# Patient Record
Sex: MALE | Race: WHITE | Employment: FULL TIME | URBAN - METROPOLITAN AREA
[De-identification: names, ages, dates, MRNs, and addresses within clinical notes are randomized per-mention and may not be internally consistent; named-entity substitution may affect disease eponyms.]

---

## 2019-01-14 ENCOUNTER — HOSPITAL ENCOUNTER (OUTPATIENT)
Dept: LAB | Age: 64
Discharge: HOME | End: 2019-01-14

## 2019-01-14 PROCEDURE — 88305 TISSUE EXAM BY PATHOLOGIST: CPT

## 2023-08-29 NOTE — PROGRESS NOTES
Name: Boyd Suh  YOB: 1955  Gender: male  MRN: [id number]    CC:   Chief Complaint   Patient presents with    Follow-up     Right shoulder    Right shoulder(s) pain, stiffness    HPI:  This patient presents with a 1 year history of Right shoulder pain and limited motion. Patient denies specific mechanism of the injury. The patient has had a right rotator cuff repair by Dr. Demar Roberts approximately 15 years ago. The patient states anterior shoulder pain with range of motion. He denies major changes in strength. The patient does note some popping. Also does state he has some numbness and tingling into the hand. Denies neck pain. Notes that this is worse during a motorcycle ride when he is using the accelerator a good bit on the right side. No Known Allergies  History reviewed. No pertinent past medical history. History reviewed. No pertinent surgical history. History reviewed. No pertinent family history. Social History     Socioeconomic History    Marital status:      Spouse name: Not on file    Number of children: Not on file    Years of education: Not on file    Highest education level: Not on file   Occupational History    Not on file   Tobacco Use    Smoking status: Never    Smokeless tobacco: Never   Substance and Sexual Activity    Alcohol use: Not on file    Drug use: Not on file    Sexual activity: Not on file   Other Topics Concern    Not on file   Social History Narrative    Not on file     Social Determinants of Health     Financial Resource Strain: Not on file   Food Insecurity: Not on file   Transportation Needs: Not on file   Physical Activity: Not on file   Stress: Not on file   Social Connections: Not on file   Intimate Partner Violence: Not on file   Housing Stability: Not on file        No flowsheet data found. Review of Systems  Non-contributory   Also noted on the patient medical history form on the chart and are reviewed today.   Pertinent positives

## 2023-09-01 ENCOUNTER — OFFICE VISIT (OUTPATIENT)
Dept: ORTHOPEDIC SURGERY | Age: 68
End: 2023-09-01

## 2023-09-01 VITALS — BODY MASS INDEX: 34.86 KG/M2 | HEIGHT: 68 IN | WEIGHT: 230 LBS

## 2023-09-01 DIAGNOSIS — M67.921 TENDINOPATHY OF RIGHT BICEPS TENDON: ICD-10-CM

## 2023-09-01 DIAGNOSIS — M25.511 RIGHT SHOULDER PAIN, UNSPECIFIED CHRONICITY: Primary | ICD-10-CM

## 2023-09-01 RX ORDER — MELOXICAM 7.5 MG/1
7.5 TABLET ORAL 2 TIMES DAILY PRN
Qty: 28 TABLET | Refills: 0 | Status: SHIPPED | OUTPATIENT
Start: 2023-09-01 | End: 2023-09-15

## 2023-09-01 RX ORDER — METHYLPREDNISOLONE ACETATE 40 MG/ML
80 INJECTION, SUSPENSION INTRA-ARTICULAR; INTRALESIONAL; INTRAMUSCULAR; SOFT TISSUE ONCE
Status: COMPLETED | OUTPATIENT
Start: 2023-09-01 | End: 2023-09-01

## 2023-09-01 RX ADMIN — METHYLPREDNISOLONE ACETATE 80 MG: 40 INJECTION, SUSPENSION INTRA-ARTICULAR; INTRALESIONAL; INTRAMUSCULAR; SOFT TISSUE at 11:19

## 2023-09-01 NOTE — PATIENT INSTRUCTIONS
motion and strength. You will work with your doctor and physical therapist to plan this exercise program. To get the best results, you need to do the exercises correctly and as often and as long as your doctor tells you. Ice    Put ice or a cold pack on your shoulder for 10 to 20 minutes at a time. Try to do this every 1 to 2 hours for the next 3 days (when you are awake) or until the swelling goes down. Put a thin cloth between the ice and your skin. Follow-up care is a key part of your treatment and safety. Be sure to make and go to all appointments, and call your doctor if you are having problems. It's also a good idea to know your test results and keep a list of the medicines you take. When should you call for help? Call 911  anytime you think you may need emergency care. For example, call if:    You passed out (lost consciousness). You have severe trouble breathing. You have chest pain, are short of breath, or cough up blood. Call your doctor now or seek immediate medical care if:    You have pain that does not get better after you take pain medicine. Your hand is cool or pale or changes color. Your cast or splint feels too tight. Your hand or fingers are tingly, weak, or numb. You are sick to your stomach or can't keep down fluids. You have loose stitches, or your incision comes open. You have signs of a blood clot in your leg (called a deep vein thrombosis), such as:  Pain in your calf, back of the knee, thigh, or groin. Redness or swelling in your leg. You have signs of infection, such as: Increased pain, swelling, warmth, or redness. Red streaks leading from the area. Pus draining from the area. A fever. Bright red blood has soaked through the bandage over your incision. Watch closely for changes in your health, and be sure to contact your doctor if:    Your cast or splint feels too tight, or your hand or fingers are swollen.      You do not get better

## 2023-10-17 ENCOUNTER — OFFICE VISIT (OUTPATIENT)
Dept: ORTHOPEDIC SURGERY | Age: 68
End: 2023-10-17
Payer: MEDICARE

## 2023-10-17 DIAGNOSIS — M25.511 RIGHT SHOULDER PAIN, UNSPECIFIED CHRONICITY: Primary | ICD-10-CM

## 2023-10-17 DIAGNOSIS — M67.921 TENDINOPATHY OF RIGHT BICEPS TENDON: ICD-10-CM

## 2023-10-17 PROCEDURE — G8417 CALC BMI ABV UP PARAM F/U: HCPCS | Performed by: ORTHOPAEDIC SURGERY

## 2023-10-17 PROCEDURE — 1036F TOBACCO NON-USER: CPT | Performed by: ORTHOPAEDIC SURGERY

## 2023-10-17 PROCEDURE — 1123F ACP DISCUSS/DSCN MKR DOCD: CPT | Performed by: ORTHOPAEDIC SURGERY

## 2023-10-17 PROCEDURE — 3017F COLORECTAL CA SCREEN DOC REV: CPT | Performed by: ORTHOPAEDIC SURGERY

## 2023-10-17 PROCEDURE — G8484 FLU IMMUNIZE NO ADMIN: HCPCS | Performed by: ORTHOPAEDIC SURGERY

## 2023-10-17 PROCEDURE — G8428 CUR MEDS NOT DOCUMENT: HCPCS | Performed by: ORTHOPAEDIC SURGERY

## 2023-10-17 PROCEDURE — 99213 OFFICE O/P EST LOW 20 MIN: CPT | Performed by: ORTHOPAEDIC SURGERY

## 2023-10-17 NOTE — PROGRESS NOTES
Social Connections: Not on file   Intimate Partner Violence: Not on file   Housing Stability: Not on file               No data to display                Review of Systems  Non-contributory    PE:       SHOULDER   Right (involved)  left   Skin Intact Intact   Radial Pulses 2+ symmetrical  2+ symmetrical   Myotomes Normal Normal   Dermatomes  Normal Normal   ROM Full Full   Strength No weakness, there is pain with mild external rotation No weakness   Atrophy None noted None noted   Effusion/Swelling  None None   Palpation Trace tender to palpate over the biceps tendon No Tenderness   Bicep Tendon Rupture  Negative, mild positive speeds/Yergason's Negative   Bear Hug, Belly Press Normal Not tested   Crossed Arm Adduction Test Not tested Not tested   Instability/Ant. Apprehension Test None  None   Impingement Negative Negative                     A/Plan:     ICD-10-CM    1. Right shoulder pain, unspecified chronicity  M25.511       2. Tendinopathy of right biceps tendon  M67.921            I discussed with the patient different treatment options in regards to the right shoulder. Overall, he is improved. Discussed we can repeat injection every 3 months if warranted. We discussed potential for biceps tendon rupture. Also discussed potential for biceps tenodesis versus tenotomy. At this time, he is doing well enough for he would like to wait and see how it continues to feel. Discussed also use of Voltaren gel. He may come back on as-needed basis if his symptoms continue and he wants to discuss surgical intervention would have to obtain an MRI. Otherwise, if he is wanting repeat injection due to exacerbation of symptoms he may return after December 1. No follow-ups on file.         [address]  10/17/23

## 2024-03-11 ENCOUNTER — TELEPHONE (OUTPATIENT)
Dept: ORTHOPEDIC SURGERY | Age: 69
End: 2024-03-11

## 2024-03-14 ENCOUNTER — OFFICE VISIT (OUTPATIENT)
Dept: ORTHOPEDIC SURGERY | Age: 69
End: 2024-03-14
Payer: MEDICARE

## 2024-03-14 DIAGNOSIS — M25.511 RIGHT SHOULDER PAIN, UNSPECIFIED CHRONICITY: Primary | ICD-10-CM

## 2024-03-14 DIAGNOSIS — M67.921 TENDINOPATHY OF RIGHT BICEPS TENDON: ICD-10-CM

## 2024-03-14 PROCEDURE — 20610 DRAIN/INJ JOINT/BURSA W/O US: CPT | Performed by: PHYSICIAN ASSISTANT

## 2024-03-14 RX ORDER — METHYLPREDNISOLONE ACETATE 40 MG/ML
80 INJECTION, SUSPENSION INTRA-ARTICULAR; INTRALESIONAL; INTRAMUSCULAR; SOFT TISSUE ONCE
Status: COMPLETED | OUTPATIENT
Start: 2024-03-14 | End: 2024-03-14

## 2024-03-14 RX ADMIN — METHYLPREDNISOLONE ACETATE 80 MG: 40 INJECTION, SUSPENSION INTRA-ARTICULAR; INTRALESIONAL; INTRAMUSCULAR; SOFT TISSUE at 11:40

## 2024-03-14 NOTE — PROGRESS NOTES
to pain, infection, steroid flare, increased blood sugar, fat necrosis, skin discoloration, and injury to blood vessels or nerves, the patient verbally consented to proceed with a glenohumeral joint injection.  They understand that we are using this is an alternative method of treatment and the decision to not proceed with elective major surgery.  We have discussed this decision in detail.  The affected right shoulder was sterilely prepped in standard fashion and injected with 2 cc of depo medrol (40mg/ml), 2 cc of 1% Lidocaine, and 2 cc of 0.5% Marcaine into the biceps tendon.  The patient tolerated the injection well.      Patient has trace posterior pain.  We discussed potential for GH CSI in the future if needed.   [place] about [place]     No follow-ups on file.        JEREMÍAS Garcia  03/14/24

## 2024-07-22 ENCOUNTER — OFFICE VISIT (OUTPATIENT)
Dept: ORTHOPEDIC SURGERY | Age: 69
End: 2024-07-22
Payer: MEDICARE

## 2024-07-22 DIAGNOSIS — M20.22 HALLUX RIGIDUS OF BOTH FEET: ICD-10-CM

## 2024-07-22 DIAGNOSIS — M20.21 HALLUX RIGIDUS OF BOTH FEET: ICD-10-CM

## 2024-07-22 DIAGNOSIS — M79.672 BILATERAL FOOT PAIN: Primary | ICD-10-CM

## 2024-07-22 DIAGNOSIS — M79.671 BILATERAL FOOT PAIN: Primary | ICD-10-CM

## 2024-07-22 PROCEDURE — 1036F TOBACCO NON-USER: CPT | Performed by: ORTHOPAEDIC SURGERY

## 2024-07-22 PROCEDURE — 3017F COLORECTAL CA SCREEN DOC REV: CPT | Performed by: ORTHOPAEDIC SURGERY

## 2024-07-22 PROCEDURE — 99214 OFFICE O/P EST MOD 30 MIN: CPT | Performed by: ORTHOPAEDIC SURGERY

## 2024-07-22 PROCEDURE — G8417 CALC BMI ABV UP PARAM F/U: HCPCS | Performed by: ORTHOPAEDIC SURGERY

## 2024-07-22 PROCEDURE — G8428 CUR MEDS NOT DOCUMENT: HCPCS | Performed by: ORTHOPAEDIC SURGERY

## 2024-07-22 PROCEDURE — 1123F ACP DISCUSS/DSCN MKR DOCD: CPT | Performed by: ORTHOPAEDIC SURGERY

## 2024-07-22 NOTE — PROGRESS NOTES
Name: Fredy Lezama  YOB: 1955  Gender: male  MRN: [id number]    Summary:   Bilateral hallux rigidus and left Planter fasciitis       CC: New Patient (Bilateral foot xrays obtained in office, Left heel pain and bilateral bunion pain )       HPI: Fredy Lezama is a 69 y.o. male who presents with New Patient (Bilateral foot xrays obtained in office, Left heel pain and bilateral bunion pain )  .  This patient presents the office with a longstanding history of bilateral forefoot pain and new onset left heel pain.    History was obtained by Patient and his wife    ROS/Meds/PSH/PMH/FH/SH: I personally reviewed the patients standard intake form.  Below are the pertinents    Tobacco:  reports that he has never smoked. He has never used smokeless tobacco.  Diabetes: None      Physical Examination:  Exam of the bilateral feet shows limited range of motion bilaterally.  He has pain at the extremes of motion but no pain in the mid range of motion.  He is palpable pulses and intact sensation.  Has mild tenderness to the plantar heel on the left without evidence of insufficiency.      Imaging:   Interpretation of imaging  Bilateral feet XR: AP, Lateral, Oblique views     ICD-10-CM    1. Bilateral foot pain  M79.671 XR Foot Standard Bilateral    M79.672       2. Hallux rigidus of both feet  M20.21     M20.22          Report: AP, lateral, oblique x-ray of the bilateral feet demonstrates hallux rigidus    Impression: Hallux rigidus   VAISHALI WEN III, MD           Assessment:   Bilateral hallux rigidus and left Lanter fasciitis    Treatment Plan:   4 This is a chronic illness/condition with exacerbation and progression  Treatment at this time: Elective major surgery with procedural risk factors  Studies ordered: NO XR needed @ Next Visit    Weight-bearing status: WBAT        Return to work/work restrictions: none  No medications given    Bilateral first MTP cheilectomy   Outpatient-30 minutes-sagittal saw

## 2024-08-05 ENCOUNTER — TELEPHONE (OUTPATIENT)
Dept: ORTHOPEDIC SURGERY | Age: 69
End: 2024-08-05

## 2024-08-06 DIAGNOSIS — M20.22 HALLUX RIGIDUS OF BOTH FEET: Primary | ICD-10-CM

## 2024-08-06 DIAGNOSIS — M20.21 HALLUX RIGIDUS OF BOTH FEET: Primary | ICD-10-CM

## 2024-08-06 NOTE — TELEPHONE ENCOUNTER
Spoke to patient and scheduled surgery for 08/20/2024 and we will send out the paperwork via mail and [employer]

## 2024-08-13 ENCOUNTER — TELEPHONE (OUTPATIENT)
Dept: ORTHOPEDIC SURGERY | Age: 69
End: 2024-08-13

## 2024-08-14 ENCOUNTER — TELEPHONE (OUTPATIENT)
Dept: ORTHOPEDIC SURGERY | Age: 69
End: 2024-08-14

## 2024-08-14 NOTE — TELEPHONE ENCOUNTER
He is calling again and just wants to make sure he hears from you and doesn't miss the call. I told him it might be tomorrow because you are on the alonso with patients and he was fine with that.

## 2024-08-14 NOTE — TELEPHONE ENCOUNTER
Left [facility] [place] for patient, if we do not speak with him today we can call him tomorrow.

## 2024-08-15 DIAGNOSIS — M20.22 HALLUX RIGIDUS OF BOTH FEET: Primary | ICD-10-CM

## 2024-08-15 DIAGNOSIS — M79.672 BILATERAL FOOT PAIN: ICD-10-CM

## 2024-08-15 DIAGNOSIS — M20.21 HALLUX RIGIDUS OF BOTH FEET: Primary | ICD-10-CM

## 2024-08-15 DIAGNOSIS — M79.671 BILATERAL FOOT PAIN: ICD-10-CM

## 2024-08-15 RX ORDER — SILDENAFIL 100 MG/1
TABLET, FILM COATED ORAL PRN
COMMUNITY
Start: 2024-06-07

## 2024-08-15 RX ORDER — ATORVASTATIN CALCIUM 10 MG/1
10 TABLET, FILM COATED ORAL DAILY
COMMUNITY

## 2024-08-15 RX ORDER — VALACYCLOVIR HYDROCHLORIDE 1 G/1
1000 TABLET, FILM COATED ORAL 2 TIMES DAILY PRN
COMMUNITY
Start: 2018-10-30

## 2024-08-15 NOTE — TELEPHONE ENCOUNTER
Spoke to patient about upcoming surgery and wanted to know of any equipment that is needed. I told him no is required but as we talked he would feel comfortable having a walker to help post op since he is having both feet worked on the same time. I have place order in chart and on the schedule for DME tomorrow 08/16/2024 at 9:30 am to  a walker at the [address] office.

## 2024-08-15 NOTE — PERIOP NOTE
Patient verified name and .  Order for consent NOT found in EHR at time of PAT visit. Unable to verify case posting against order; surgery verified by patient.    Type 1B surgery, phone assessment complete.  Orders not received.  Labs per surgeon: none ordered  Labs per anesthesia protocol: not indicated    Patient answered medical/surgical history questions at their best of ability. All prior to admission medications documented in EPIC.    Patient instructed to continue taking all prescription medications up to the day of surgery but to take only the following medications the day of surgery according to anesthesia guidelines with a small sip of water: atorvastatin.   Also, patient is requested to take 2 Tylenol in the morning and then again before bed on the day before surgery. Regular or extra strength may be used.       Patient informed that all vitamins and supplements should be held 7 days prior to surgery and NSAIDS 5 days prior to surgery. Prescription meds to hold:  viagra hold 24 hours prior to surgery    Patient instructed on the following:    > Arrive at [facility] Entrance, time of arrival to be called the day before by 1700  > NPO after midnight, unless otherwise indicated, including gum, mints, and ice chips  > Responsible adult must drive patient to the hospital, stay during surgery, and patient will need supervision 24 hours after anesthesia  > Use non moisturizing soap in shower the night before surgery and on the morning of surgery  > All piercings must be removed prior to arrival.    > Leave all valuables (money and jewelry) at home but bring insurance card and ID on DOS.   > You may be required to pay a deductible or co-pay on the day of your procedure. You can pre-pay by calling [phone] if your surgery is at the [facility] or [phone] if your surgery is at the [facility].  > Do not wear make-up, nail polish, lotions, cologne, perfumes, powders, or oil on skin. Artificial nails are not

## 2024-08-16 ENCOUNTER — OFFICE VISIT (OUTPATIENT)
Dept: ORTHOPEDIC SURGERY | Age: 69
End: 2024-08-16

## 2024-08-16 DIAGNOSIS — M20.21 HALLUX RIGIDUS OF BOTH FEET: ICD-10-CM

## 2024-08-16 DIAGNOSIS — M79.672 BILATERAL FOOT PAIN: Primary | ICD-10-CM

## 2024-08-16 DIAGNOSIS — M20.22 HALLUX RIGIDUS OF BOTH FEET: ICD-10-CM

## 2024-08-16 DIAGNOSIS — M79.671 BILATERAL FOOT PAIN: Primary | ICD-10-CM

## 2024-08-16 NOTE — PROGRESS NOTES
Patient was fitted and instructed on use of walker with wheels. I demonstrated the correct way to lock and unlock the walker. The walker was adjusted to the patient's height and arm length. Patient walked around with the walker to insure it was set at a comfortable height.    Patient read and signed documenting they understand and agree to [facility]'s current DME return policy.     The above DME items were also checked for same/similar on the Medicare portal. An ABN was issued if necessary.

## 2024-08-19 ENCOUNTER — ANESTHESIA EVENT (OUTPATIENT)
Dept: SURGERY | Age: 69
End: 2024-08-19
Payer: MEDICARE

## 2024-08-19 NOTE — PERIOP NOTE
Preop department called to notify patient of arrival time for scheduled procedure. Instructions given to   - Arrive at [facility]PC Entrance [address].  - Remain NPO after midnight, unless otherwise indicated, including gum, mints, and ice chips.   - Have a responsible adult to drive patient to the hospital, stay during surgery, and patient will need supervision 24 hours after anesthesia.   - Use antibacterial soap in shower the night before surgery and on the morning of surgery.       Was patient contacted: yes-pt  Voicemail left:   Numbers contacted: [phone]   Arrival time: 0730

## 2024-08-20 ENCOUNTER — ANESTHESIA (OUTPATIENT)
Dept: SURGERY | Age: 69
End: 2024-08-20
Payer: MEDICARE

## 2024-08-20 ENCOUNTER — HOSPITAL ENCOUNTER (OUTPATIENT)
Age: 69
Setting detail: OUTPATIENT SURGERY
Discharge: HOME | End: 2024-08-20
Attending: ORTHOPAEDIC SURGERY | Admitting: ORTHOPAEDIC SURGERY
Payer: MEDICARE

## 2024-08-20 VITALS
WEIGHT: 230 LBS | OXYGEN SATURATION: 99 % | SYSTOLIC BLOOD PRESSURE: 168 MMHG | HEIGHT: 68 IN | RESPIRATION RATE: 12 BRPM | TEMPERATURE: 97.5 F | DIASTOLIC BLOOD PRESSURE: 83 MMHG | HEART RATE: 53 BPM | BODY MASS INDEX: 34.86 KG/M2

## 2024-08-20 DIAGNOSIS — G89.18 ACUTE POSTOPERATIVE PAIN: Primary | ICD-10-CM

## 2024-08-20 PROCEDURE — 7100000001 HC PACU RECOVERY - ADDTL 15 MIN: Performed by: ORTHOPAEDIC SURGERY

## 2024-08-20 PROCEDURE — 2709999900 HC NON-CHARGEABLE SUPPLY: Performed by: ORTHOPAEDIC SURGERY

## 2024-08-20 PROCEDURE — 6360000002 HC RX W HCPCS: Performed by: ORTHOPAEDIC SURGERY

## 2024-08-20 PROCEDURE — 6370000000 HC RX 637 (ALT 250 FOR IP): Performed by: ANESTHESIOLOGY

## 2024-08-20 PROCEDURE — 7100000000 HC PACU RECOVERY - FIRST 15 MIN: Performed by: ORTHOPAEDIC SURGERY

## 2024-08-20 PROCEDURE — 3700000000 HC ANESTHESIA ATTENDED CARE: Performed by: ORTHOPAEDIC SURGERY

## 2024-08-20 PROCEDURE — 3600000014 HC SURGERY LEVEL 4 ADDTL 15MIN: Performed by: ORTHOPAEDIC SURGERY

## 2024-08-20 PROCEDURE — 7100000010 HC PHASE II RECOVERY - FIRST 15 MIN: Performed by: ORTHOPAEDIC SURGERY

## 2024-08-20 PROCEDURE — 2580000003 HC RX 258: Performed by: ANESTHESIOLOGY

## 2024-08-20 PROCEDURE — 6360000002 HC RX W HCPCS: Performed by: NURSE PRACTITIONER

## 2024-08-20 PROCEDURE — 6360000002 HC RX W HCPCS: Performed by: ANESTHESIOLOGY

## 2024-08-20 PROCEDURE — 3700000001 HC ADD 15 MINUTES (ANESTHESIA): Performed by: ORTHOPAEDIC SURGERY

## 2024-08-20 PROCEDURE — 6360000002 HC RX W HCPCS: Performed by: NURSE ANESTHETIST, CERTIFIED REGISTERED

## 2024-08-20 PROCEDURE — 2500000003 HC RX 250 WO HCPCS: Performed by: NURSE ANESTHETIST, CERTIFIED REGISTERED

## 2024-08-20 PROCEDURE — 3600000004 HC SURGERY LEVEL 4 BASE: Performed by: ORTHOPAEDIC SURGERY

## 2024-08-20 RX ORDER — SODIUM CHLORIDE, SODIUM LACTATE, POTASSIUM CHLORIDE, CALCIUM CHLORIDE 600; 310; 30; 20 MG/100ML; MG/100ML; MG/100ML; MG/100ML
INJECTION, SOLUTION INTRAVENOUS CONTINUOUS
Status: DISCONTINUED | OUTPATIENT
Start: 2024-08-20 | End: 2024-08-20 | Stop reason: HOSPADM

## 2024-08-20 RX ORDER — SODIUM CHLORIDE 9 MG/ML
INJECTION, SOLUTION INTRAVENOUS PRN
Status: DISCONTINUED | OUTPATIENT
Start: 2024-08-20 | End: 2024-08-20 | Stop reason: HOSPADM

## 2024-08-20 RX ORDER — DIPHENHYDRAMINE HYDROCHLORIDE 50 MG/ML
12.5 INJECTION INTRAMUSCULAR; INTRAVENOUS
Status: DISCONTINUED | OUTPATIENT
Start: 2024-08-20 | End: 2024-08-20 | Stop reason: HOSPADM

## 2024-08-20 RX ORDER — FENTANYL CITRATE 50 UG/ML
100 INJECTION, SOLUTION INTRAMUSCULAR; INTRAVENOUS
Status: DISCONTINUED | OUTPATIENT
Start: 2024-08-20 | End: 2024-08-20 | Stop reason: HOSPADM

## 2024-08-20 RX ORDER — CEPHALEXIN 500 MG/1
500 CAPSULE ORAL 4 TIMES DAILY
Qty: 12 CAPSULE | Refills: 0 | Status: SHIPPED | OUTPATIENT
Start: 2024-08-20

## 2024-08-20 RX ORDER — SODIUM CHLORIDE 0.9 % (FLUSH) 0.9 %
5-40 SYRINGE (ML) INJECTION EVERY 12 HOURS SCHEDULED
Status: DISCONTINUED | OUTPATIENT
Start: 2024-08-20 | End: 2024-08-20 | Stop reason: HOSPADM

## 2024-08-20 RX ORDER — MIDAZOLAM HYDROCHLORIDE 2 MG/2ML
2 INJECTION, SOLUTION INTRAMUSCULAR; INTRAVENOUS
Status: DISCONTINUED | OUTPATIENT
Start: 2024-08-20 | End: 2024-08-20 | Stop reason: HOSPADM

## 2024-08-20 RX ORDER — SODIUM CHLORIDE 0.9 % (FLUSH) 0.9 %
5-40 SYRINGE (ML) INJECTION PRN
Status: DISCONTINUED | OUTPATIENT
Start: 2024-08-20 | End: 2024-08-20 | Stop reason: HOSPADM

## 2024-08-20 RX ORDER — HYDROMORPHONE HYDROCHLORIDE 2 MG/ML
0.5 INJECTION, SOLUTION INTRAMUSCULAR; INTRAVENOUS; SUBCUTANEOUS EVERY 10 MIN PRN
Status: DISCONTINUED | OUTPATIENT
Start: 2024-08-20 | End: 2024-08-20 | Stop reason: HOSPADM

## 2024-08-20 RX ORDER — OXYCODONE HYDROCHLORIDE 5 MG/1
5 TABLET ORAL EVERY 6 HOURS PRN
Qty: 20 TABLET | Refills: 0 | Status: SHIPPED | OUTPATIENT
Start: 2024-08-20 | End: 2024-08-25

## 2024-08-20 RX ORDER — IBUPROFEN 600 MG/1
1 TABLET ORAL PRN
Status: DISCONTINUED | OUTPATIENT
Start: 2024-08-20 | End: 2024-08-20 | Stop reason: HOSPADM

## 2024-08-20 RX ORDER — LIDOCAINE HYDROCHLORIDE 10 MG/ML
1 INJECTION, SOLUTION INFILTRATION; PERINEURAL
Status: DISCONTINUED | OUTPATIENT
Start: 2024-08-20 | End: 2024-08-20 | Stop reason: HOSPADM

## 2024-08-20 RX ORDER — ASPIRIN 81 MG/1
81 TABLET ORAL 2 TIMES DAILY
Qty: 60 TABLET | Refills: 0 | Status: SHIPPED | OUTPATIENT
Start: 2024-08-20

## 2024-08-20 RX ORDER — LIDOCAINE HYDROCHLORIDE 20 MG/ML
INJECTION, SOLUTION EPIDURAL; INFILTRATION; INTRACAUDAL; PERINEURAL PRN
Status: DISCONTINUED | OUTPATIENT
Start: 2024-08-20 | End: 2024-08-20 | Stop reason: SDUPTHER

## 2024-08-20 RX ORDER — ACETAMINOPHEN 500 MG
1000 TABLET ORAL ONCE
Status: COMPLETED | OUTPATIENT
Start: 2024-08-20 | End: 2024-08-20

## 2024-08-20 RX ORDER — BUPIVACAINE HYDROCHLORIDE 5 MG/ML
INJECTION, SOLUTION EPIDURAL; INTRACAUDAL PRN
Status: DISCONTINUED | OUTPATIENT
Start: 2024-08-20 | End: 2024-08-20 | Stop reason: ALTCHOICE

## 2024-08-20 RX ORDER — PROPOFOL 10 MG/ML
INJECTION, EMULSION INTRAVENOUS PRN
Status: DISCONTINUED | OUTPATIENT
Start: 2024-08-20 | End: 2024-08-20 | Stop reason: SDUPTHER

## 2024-08-20 RX ORDER — DEXTROSE MONOHYDRATE 100 MG/ML
INJECTION, SOLUTION INTRAVENOUS CONTINUOUS PRN
Status: DISCONTINUED | OUTPATIENT
Start: 2024-08-20 | End: 2024-08-20 | Stop reason: HOSPADM

## 2024-08-20 RX ORDER — NALOXONE HYDROCHLORIDE 0.4 MG/ML
INJECTION, SOLUTION INTRAMUSCULAR; INTRAVENOUS; SUBCUTANEOUS PRN
Status: DISCONTINUED | OUTPATIENT
Start: 2024-08-20 | End: 2024-08-20 | Stop reason: HOSPADM

## 2024-08-20 RX ORDER — PROCHLORPERAZINE EDISYLATE 5 MG/ML
5 INJECTION INTRAMUSCULAR; INTRAVENOUS
Status: DISCONTINUED | OUTPATIENT
Start: 2024-08-20 | End: 2024-08-20 | Stop reason: HOSPADM

## 2024-08-20 RX ORDER — OXYCODONE HYDROCHLORIDE 5 MG/1
5 TABLET ORAL
Status: COMPLETED | OUTPATIENT
Start: 2024-08-20 | End: 2024-08-20

## 2024-08-20 RX ADMIN — OXYCODONE HYDROCHLORIDE 5 MG: 5 TABLET ORAL at 10:41

## 2024-08-20 RX ADMIN — ACETAMINOPHEN 1000 MG: 500 TABLET, FILM COATED ORAL at 08:12

## 2024-08-20 RX ADMIN — PROPOFOL 100 MG: 10 INJECTION, EMULSION INTRAVENOUS at 09:24

## 2024-08-20 RX ADMIN — LIDOCAINE HYDROCHLORIDE 100 MG: 20 INJECTION, SOLUTION EPIDURAL; INFILTRATION; INTRACAUDAL; PERINEURAL at 09:22

## 2024-08-20 RX ADMIN — PROPOFOL 100 MG: 10 INJECTION, EMULSION INTRAVENOUS at 09:23

## 2024-08-20 RX ADMIN — SODIUM CHLORIDE, POTASSIUM CHLORIDE, SODIUM LACTATE AND CALCIUM CHLORIDE: 600; 310; 30; 20 INJECTION, SOLUTION INTRAVENOUS at 08:12

## 2024-08-20 RX ADMIN — HYDROMORPHONE HYDROCHLORIDE 0.5 MG: 2 INJECTION INTRAMUSCULAR; INTRAVENOUS; SUBCUTANEOUS at 10:36

## 2024-08-20 RX ADMIN — Medication 2000 MG: at 09:30

## 2024-08-20 RX ADMIN — PROPOFOL 200 MG: 10 INJECTION, EMULSION INTRAVENOUS at 09:22

## 2024-08-20 ASSESSMENT — PAIN SCALES - GENERAL
PAINLEVEL_OUTOF10: 5
PAINLEVEL_OUTOF10: 6

## 2024-08-20 ASSESSMENT — PAIN - FUNCTIONAL ASSESSMENT: PAIN_FUNCTIONAL_ASSESSMENT: 0-10

## 2024-08-20 NOTE — ANESTHESIA PRE PROCEDURE
Department of Anesthesiology  Preprocedure Note       Name:  Fredy Lezama   Age:  69 y.o.  :  1955                                          MRN:  [id number]         Date:  2024      Surgeon: Surgeon(s):  William Patel III, MD    Procedure: Procedure(s):  Bilateral first metatarsophangeal cheilectomy    Medications prior to admission:   Prior to Admission medications    Medication Sig Start Date End Date Taking? Authorizing Provider   valACYclovir (VALTREX) 1 g tablet Take 1 tablet by mouth 2 times daily as needed 10/30/18  Yes Provider, MD Kassy   sildenafil (VIAGRA) 100 MG tablet as needed 24  Yes Provider, MD Kassy   Naproxen Sodium (ALEVE PO) Take by mouth as needed   Yes ProviderKassy MD   atorvastatin (LIPITOR) 10 MG tablet 1 tablet daily    Provider, MD Kassy       Current medications:    Current Facility-Administered Medications   Medication Dose Route Frequency Provider Last Rate Last Admin   • ceFAZolin (ANCEF) 2000 mg in sterile water 20 mL IV syringe  2,000 mg IntraVENous On Call to OR Stephani Chao APRN - CNP       • lactated ringers IV soln infusion   IntraVENous Continuous Stephani Chao APRN - CNP       • sodium chloride flush 0.9 % injection 5-40 mL  5-40 mL IntraVENous 2 times per day Stephani Chao APRN - CNP       • sodium chloride flush 0.9 % injection 5-40 mL  5-40 mL IntraVENous PRN Stephani Chao APRN - CNP       • 0.9 % sodium chloride infusion   IntraVENous PRN Stephani Chao APRN - CNP       • lidocaine 1 % injection 1 mL  1 mL IntraDERmal Once PRN Chele Crowell MD       • acetaminophen (TYLENOL) tablet 1,000 mg  1,000 mg Oral Once Chele Crowell MD       • fentaNYL (SUBLIMAZE) injection 100 mcg  100 mcg IntraVENous Once PRN Chele Crowell MD       • lactated ringers IV soln infusion   IntraVENous Continuous Chele Crowell MD       • sodium chloride flush 0.9 % injection 5-40 mL  5-40 mL IntraVENous 2

## 2024-08-20 NOTE — H&P
Outpatient Surgery History and Physical:  Fredy Lezama was seen and examined.    CHIEF COMPLAINT:   bilateral feet.     PE:   BP (!) 140/81   Pulse 51   Temp 98.1 °F (36.7 °C) (Oral)   Resp 18   Ht 1.727 m (5' 8\")   Wt 104.3 kg (230 lb)   SpO2 95%   BMI 34.97 kg/m²     Heart:   Regular rhythm      Lungs:  Are clear      Past Medical History:    Past Medical History:   Diagnosis Date    Hyperlipidemia        Surgical History:   Past Surgical History:   Procedure Laterality Date    ROTATOR CUFF REPAIR Bilateral        Social History: Patient  reports that he has never smoked. He has never used smokeless tobacco. He reports current alcohol use of about 10.0 standard drinks of alcohol per week. He reports that he does not use drugs.    Family History: History reviewed. No pertinent family history.    Allergies: Reviewed per EMR  Patient has no known allergies.    Medications:    Prior to Admission medications    Medication Sig Start Date End Date Taking? Authorizing Provider   atorvastatin (LIPITOR) 10 MG tablet 1 tablet daily   Yes Kassy Webster MD   Naproxen Sodium (ALEVE PO) Take by mouth as needed   Yes Kassy Webster MD   valACYclovir (VALTREX) 1 g tablet Take 1 tablet by mouth 2 times daily as needed 10/30/18   Kassy Webster MD   sildenafil (VIAGRA) 100 MG tablet as needed 6/7/24   ProviderKassy MD       The surgery is planned for the Bilateral first metatarsophangeal cheilectomy .        History and physical has been reviewed. The patient has been examined. There have been no significant clinical changes since the completion of the originally dated History and Physical.  Patient identified by surgeon; surgical site was confirmed by patient and surgeon.      The patient is here today for outpatient surgery. I have examined the patient, no changes are noted in the patient's medical status. Necessity for the procedure/care is still present and the history and physical above

## 2024-08-20 NOTE — DISCHARGE INSTRUCTIONS
POSTOPERATIVE SURGICAL INSTRUCTIONS/ INFORMATION:    Your narcotic (pain medication) and an antibiotic will be sent to the pharmacy listed in your chart.  Both of these medications should be taken as directed on the bottle.      Pain can be hard to manage postoperatively especially if you had an anesthetic nerve block and do not know when it will wear off.  It is recommended that you start taking pain medication even with an anesthetic block the night of surgery.  The anesthetic nerve block can last up to 72 hours postoperatively, your leg will likely be numb as long as the anesthetic block is working.      If you are taking the pain medication as directed on the bottle and your pain is not managed or controlled you may double the medication, taking 2 tablets every 4-6 hours as needed for 24 hours.  Once pain level is controlled you will resume the recommended dosage on the bottle.    Pain medication may cause constipation, to help minimize this ensure you are drinking plenty of water after surgery.  You may start a stool softener and/or MiraLAX to help alleviate or mitigate this problem.  Please take these over-the-counter products as directed on the bottle.    Please make every effort to leave your postoperative dressing that was placed sterilely in the operating room and placed until your first postoperative visit.    If bleeding through your bandage occurs you may reinforce the dressing with additional gauze and an Ace wrap.    PLEASE DO NOT GET THE SURGICAL DRESSING WET.  It is recommended using a snug compressive device such as a cast bag to prevent the dressing from getting wet when bathing if you need assistance in any way with this please call our office.    Nausea and vomiting can be common after surgical procedure.  Please ensure you take narcotics (pain medication) with food.  If the symptoms become severe please call our office.    Fevers may also be common after surgery, it is one of the body's many

## 2024-08-20 NOTE — OP NOTE
Operative Note    Patient:Fredy Lezama  MRN: [id number]    Date Of Surgery: 8/20/2024    Surgeon: William Patel MD    Assistant Surgeon: None    Pre Op Diagnosis:  Pre-Op Diagnosis Codes:      * Hallux rigidus of both feet [M20.21, M20.22]      Post Op Diagnosis:   same    Procedures Performed:  Bilateral first MTP cheilectomy's, 28289x2    Implants:   * No implants in log *    Anesthesia:  Choice    Blood Loss:  minimal    Tourniquet:  Estimated calf 15 minutes left and right    Pre Operative Abx:   Ancef 2g            Pre Operative Course:  Fredy Lezama is a 69 y.o. male who has a history of bilateral hallux rigidus.    Operation In Detail:  Patient was evaluated in the preoperative area.  We had a long discussion about the procedure and postoperative protocols.  The patient was then brought back to the operating room suite and placed in the operating room table.  A timeout was taken to identify the patient, procedure being performed, and laterality.  After this the patient was prepped and draped in the normal sterile fashion using a Betadine solution and/or a ChloraPrep solution.  A timeout was then taken to identify the patient his name, date of birth, laterality, and procedure being performed.  We also identified allergies and any concerns about the operation.  Attention was then placed to the operative extremity.  During a preop surgical timeout the correct operative sites were identified and prepped and draped in the standard sterile fashions and ChloraPrep solution.  Dorsal approach just above the bilateral first MTP joints were opened at that time followed by capsulotomies.  All loose bodies were removed at that time.  A sagittal saw was used to remove large osteophytes metatarsal head and a rongeur was used to decompress both gutters of synovitis and remove bone spurs from the base of the proximal phalanx.  Both wounds were then irrigated and closed using Vicryl and the capsule followed by

## 2024-08-20 NOTE — ANESTHESIA POSTPROCEDURE EVALUATION
Department of Anesthesiology  Postprocedure Note    Patient: Fredy Lezama  MRN: [id number]  YOB: 1955  Date of evaluation: 8/20/2024    Procedure Summary       Date: 08/20/24 Room / Location: [facility] OP OR 01 / [facility]    Anesthesia Start: 0919 Anesthesia Stop: 1015    Procedure: Bilateral first metatarsophangeal cheilectomy (Bilateral: Foot) Diagnosis:       Hallux rigidus of both feet      (Hallux rigidus of both feet [M20.21, M20.22])    Surgeons: William Patel III, MD Responsible Provider: Chele Crowell MD    Anesthesia Type: General ASA Status: 2            Anesthesia Type: General    Rachelle Phase I: Rachelle Score: 6    Rachelle Phase II: Rachelle Score: 10    Anesthesia Post Evaluation    Patient location during evaluation: PACU  Patient participation: complete - patient participated  Level of consciousness: awake and alert  Airway patency: patent  Nausea: well controlled.  Cardiovascular status: acceptable.  Respiratory status: acceptable  Hydration status: stable  Pain management: adequate    No notable events documented.

## 2024-09-04 ENCOUNTER — TELEPHONE (OUTPATIENT)
Dept: ORTHOPEDIC SURGERY | Age: 69
End: 2024-09-04

## 2024-09-04 ENCOUNTER — OFFICE VISIT (OUTPATIENT)
Dept: ORTHOPEDIC SURGERY | Age: 69
End: 2024-09-04

## 2024-09-04 DIAGNOSIS — M20.22 HALLUX RIGIDUS OF BOTH FEET: Primary | ICD-10-CM

## 2024-09-04 DIAGNOSIS — M20.21 HALLUX RIGIDUS OF BOTH FEET: Primary | ICD-10-CM

## 2024-09-04 PROCEDURE — 99024 POSTOP FOLLOW-UP VISIT: CPT | Performed by: ORTHOPAEDIC SURGERY

## 2024-09-04 NOTE — TELEPHONE ENCOUNTER
This patient was  seen today and  he  did not take  home  a home  exercise  sheet    He has  called  back and  ask  for you to please  email  it  to  him

## 2024-09-04 NOTE — PROGRESS NOTES
Name: Fredy Lezama  YOB: 1955  Gender: male  MRN: [id number]    Procedure Performed:Bilateral first metatarsophangeal cheilectomy - Bilateral        Date of Procedure: 8/20/2024      Subjective: Doing well      Physical Examination: Incisions are healing well without sign of infection.        Imaging:   No imaging reviewed           VAISHALI WEN III, MD           Assessment:   Bilateral cheilectomy      Plan:   3 This is stable chronic illness/condition  Treatment at this time: Physical Therapy  Studies ordered: NO XR needed @ Next Visit    Weight-bearing status: WBAT        Return to work/work restrictions: none  No medications given      He can start supervised PT and return in 4 weeks

## 2024-09-09 ENCOUNTER — HOSPITAL ENCOUNTER (OUTPATIENT)
Dept: PHYSICAL THERAPY | Age: 69
Setting detail: RECURRING SERIES
Discharge: HOME | End: 2024-09-12
Attending: ORTHOPAEDIC SURGERY
Payer: MEDICARE

## 2024-09-09 DIAGNOSIS — Z98.890 STATUS POST BILATERAL FOOT SURGERY: Primary | ICD-10-CM

## 2024-09-09 DIAGNOSIS — M20.22 HALLUX RIGIDUS OF BOTH FEET: ICD-10-CM

## 2024-09-09 DIAGNOSIS — M20.21 HALLUX RIGIDUS OF BOTH FEET: ICD-10-CM

## 2024-09-09 PROCEDURE — 97110 THERAPEUTIC EXERCISES: CPT

## 2024-09-09 PROCEDURE — 97161 PT EVAL LOW COMPLEX 20 MIN: CPT

## 2024-09-09 ASSESSMENT — PAIN DESCRIPTION - LOCATION: LOCATION: FOOT

## 2024-09-09 ASSESSMENT — PAIN SCALES - GENERAL: PAINLEVEL_OUTOF10: 0

## 2024-09-12 ENCOUNTER — HOSPITAL ENCOUNTER (OUTPATIENT)
Dept: PHYSICAL THERAPY | Age: 69
Setting detail: RECURRING SERIES
Discharge: HOME | End: 2024-09-15
Attending: ORTHOPAEDIC SURGERY
Payer: MEDICARE

## 2024-09-12 PROCEDURE — 97110 THERAPEUTIC EXERCISES: CPT

## 2024-09-12 PROCEDURE — 97140 MANUAL THERAPY 1/> REGIONS: CPT

## 2024-09-12 ASSESSMENT — PAIN SCALES - GENERAL: PAINLEVEL_OUTOF10: 2

## 2024-09-16 ENCOUNTER — HOSPITAL ENCOUNTER (OUTPATIENT)
Dept: PHYSICAL THERAPY | Age: 69
Setting detail: RECURRING SERIES
Discharge: HOME | End: 2024-09-19
Attending: ORTHOPAEDIC SURGERY
Payer: MEDICARE

## 2024-09-16 PROCEDURE — 97140 MANUAL THERAPY 1/> REGIONS: CPT

## 2024-09-16 PROCEDURE — 97110 THERAPEUTIC EXERCISES: CPT

## 2024-09-16 ASSESSMENT — PAIN SCALES - GENERAL: PAINLEVEL_OUTOF10: 1

## 2024-09-19 ENCOUNTER — HOSPITAL ENCOUNTER (OUTPATIENT)
Dept: PHYSICAL THERAPY | Age: 69
Setting detail: RECURRING SERIES
Discharge: HOME | End: 2024-09-22
Attending: ORTHOPAEDIC SURGERY
Payer: MEDICARE

## 2024-09-19 PROCEDURE — 97110 THERAPEUTIC EXERCISES: CPT

## 2024-09-19 PROCEDURE — 97140 MANUAL THERAPY 1/> REGIONS: CPT

## 2024-09-24 ENCOUNTER — HOSPITAL ENCOUNTER (OUTPATIENT)
Dept: PHYSICAL THERAPY | Age: 69
Setting detail: RECURRING SERIES
Discharge: HOME | End: 2024-09-27
Attending: ORTHOPAEDIC SURGERY
Payer: MEDICARE

## 2024-09-24 PROCEDURE — 97110 THERAPEUTIC EXERCISES: CPT

## 2024-09-24 PROCEDURE — 97140 MANUAL THERAPY 1/> REGIONS: CPT

## 2024-09-26 ENCOUNTER — HOSPITAL ENCOUNTER (OUTPATIENT)
Dept: PHYSICAL THERAPY | Age: 69
Setting detail: RECURRING SERIES
Discharge: HOME | End: 2024-09-29
Attending: ORTHOPAEDIC SURGERY
Payer: MEDICARE

## 2024-09-26 PROCEDURE — 97110 THERAPEUTIC EXERCISES: CPT

## 2024-09-26 ASSESSMENT — PAIN SCALES - GENERAL: PAINLEVEL_OUTOF10: 3

## 2024-09-26 ASSESSMENT — PAIN DESCRIPTION - LOCATION: LOCATION: FOOT

## 2024-09-26 NOTE — PROGRESS NOTES
Fredy Lezaam  : 1955  Primary: Medicare Part A And B (Medicare)  Secondary: [employer] [place] MEDICARE SUPP [facility] [place] [address]  Phone: [phone]  Fax: [phone] Plan Frequency: 2x per week    Plan of Care/Certification Expiration Date: 24        Plan of Care/Certification Expiration Date:  Plan of Care/Certification Expiration Date: 24    Frequency/Duration:  Plan Frequency: 2x per week       Time In/Out:   Time In: 0958  Time Out: 1026      PT Visit Info:    Plan Frequency: 2x per week  Total # of Visits to Date: 6  Progress Note Counter: 6      Visit Count: 6    OUTPATIENT PHYSICAL THERAPY:  Treatment Note 2024       Charge Capture   Episode (Hallux rigidus post-op)               Treatment Diagnosis:  Status post bilateral foot surgery  Hallux rigidus of both feet  Medical/Referring Diagnosis:    Hallux rigidus of both feet [M20.21, M20.22]    Referring Physician: William Patel III, MD MD Orders: PT Eval and Treat  Return MD Appt: TBD  Date of Onset: 24 (DOS)  Allergies: Patient has no known allergies.  Restrictions/Precautions:   Weight Bearing Status: WBAT      Interventions Planned: (Treatment may consist of any combination of the following):  Current Treatment Recommendations: Strengthening; ROM; Balance training; Neuromuscular re-education; Manual; Pain management; Home exercise program; Safety education & training; Modalities; Positioning; Dry needling; Therapeutic activities    Subjective Comments: Patient reports he's doing well this morning. He says he's been on his feet a lot going up and down ladders. He arrives late to his appointment this morning due to weather.    Initial Pain Level:   Foot 3/10  Post Session Pain Level:   Foot 3/10     Medications Last Reviewed: 2024    Updated Objective Findings: None Today    Treatment     THERAPEUTIC EXERCISE: (28 minutes): Exercises per grid below to

## 2024-09-26 NOTE — PROGRESS NOTES
Fredy Lezama  : 1955  Primary: Medicare Part A And B  Secondary: [employer] [place] MEDICARE SUPP [facility] @ [facility]  [address] [place] [address] [place] [address]  Phone: [phone]  Fax: [phone] Plan Frequency: 2x per week    Plan of Care/Certification Expiration Date: 24      PT Visit Info:  Total # of Visits to Date: 6  Progress Note Counter: 6         OUTPATIENT PHYSICAL THERAPY 2024     Appt Desk   Episode   MyChart      DISCONTINUATION SUMMARY: Mr. Lezama participated in 6 physical therapy visit(s). Treatment has been discontinued at this time. The goals established at the start of care were not met due to inability to reassess secondary to lack of participation. The current episode of care will be closed at this time.     Thank you for this referral.     Kash Valverde, PT, DPT

## 2024-09-30 ENCOUNTER — APPOINTMENT (OUTPATIENT)
Dept: PHYSICAL THERAPY | Age: 69
End: 2024-09-30
Attending: ORTHOPAEDIC SURGERY
Payer: MEDICARE

## 2024-10-02 ENCOUNTER — OFFICE VISIT (OUTPATIENT)
Dept: ORTHOPEDIC SURGERY | Age: 69
End: 2024-10-02

## 2024-10-02 DIAGNOSIS — M20.21 HALLUX RIGIDUS OF BOTH FEET: Primary | ICD-10-CM

## 2024-10-02 DIAGNOSIS — M20.22 HALLUX RIGIDUS OF BOTH FEET: Primary | ICD-10-CM

## 2024-10-02 PROCEDURE — 99024 POSTOP FOLLOW-UP VISIT: CPT | Performed by: NURSE PRACTITIONER

## 2024-10-02 NOTE — PROGRESS NOTES
Name: Fredy Lezama  YOB: 1955  Gender: male  MRN: [id number]    Procedure Performed:Bilateral first metatarsophangeal cheilectomy - Bilateral           Date of Procedure: 8/20/2024    Subjective: Patient reports he is doing okay since his last visit.  He has progressed to jogging and does continue with exercises at home to help with mobility of the great toes as well as for the left-sided plantar fasciitis.  He did go to therapy and did receive some benefit from this.      Physical Examination: Incisions to bilateral first MTP joints is well-healed at this point there are no signs of infection.  He does have palpable pulses and intact sensation to bilateral feet.  He is able to plantarflex the great toes bilaterally easier than dorsiflexion.  He is able to achieve about 40 degrees of dorsiflexion to bilateral toes.        Imaging:   No imaging reviewed          Assessment:   Status post bilateral first MTP cheilectomy's.      Plan:   3 This is stable chronic illness/condition  Treatment at this time: Time with no intervention, he may follow-up on a as needed basis.  There are no longer any restrictions on his levels of activity he may do as he can tolerate.  Studies ordered: NO XR needed @ Next Visit    Weight-bearing status: WBAT        Return to work/work restrictions: none  No medications given

## 2025-01-28 ENCOUNTER — OFFICE VISIT (OUTPATIENT)
Dept: ORTHOPEDIC SURGERY | Age: 70
End: 2025-01-28
Payer: MEDICARE

## 2025-01-28 DIAGNOSIS — M67.921 TENDINOPATHY OF RIGHT BICEPS TENDON: Primary | ICD-10-CM

## 2025-01-28 DIAGNOSIS — M25.511 RIGHT SHOULDER PAIN, UNSPECIFIED CHRONICITY: ICD-10-CM

## 2025-01-28 PROCEDURE — G8417 CALC BMI ABV UP PARAM F/U: HCPCS | Performed by: PHYSICIAN ASSISTANT

## 2025-01-28 PROCEDURE — 99214 OFFICE O/P EST MOD 30 MIN: CPT | Performed by: PHYSICIAN ASSISTANT

## 2025-01-28 PROCEDURE — 1036F TOBACCO NON-USER: CPT | Performed by: PHYSICIAN ASSISTANT

## 2025-01-28 PROCEDURE — 3017F COLORECTAL CA SCREEN DOC REV: CPT | Performed by: PHYSICIAN ASSISTANT

## 2025-01-28 PROCEDURE — 1123F ACP DISCUSS/DSCN MKR DOCD: CPT | Performed by: PHYSICIAN ASSISTANT

## 2025-01-28 PROCEDURE — G8427 DOCREV CUR MEDS BY ELIG CLIN: HCPCS | Performed by: PHYSICIAN ASSISTANT

## 2025-01-28 RX ORDER — METHYLPREDNISOLONE ACETATE 40 MG/ML
80 INJECTION, SUSPENSION INTRA-ARTICULAR; INTRALESIONAL; INTRAMUSCULAR; SOFT TISSUE ONCE
Status: DISCONTINUED | OUTPATIENT
Start: 2025-01-28 | End: 2025-01-29

## 2025-01-28 NOTE — PROGRESS NOTES
Name: Fredy Lezama  YOB: 1955  Gender: male  MRN: [id number]    CC:   Chief Complaint   Patient presents with    Injections     Right shoulder CSI        History of Present Illness  The patient is a 69-year-old male who presents for a follow-up of his right shoulder.    He reports that the injection administered to his shoulder biceps tendon on 3-14-24 provided significant relief. However, he currently experiences pain predominantly on the outer aspect of the shoulder, which radiates into the elbow joint. He does not experience any popping, clicking, or grinding sensations, nor does he report any numbness or tingling. He recalls an incident in November where he slipped on stairs while wearing stockings, landing on his elbow and jamming it. Prior to this incident, he had mild discomfort, but following the incident, he was unable to move his arm for several days. He speculates that he may have dislocated and relocated the joint. He does not report any loss of strength but notes increased pain when attempting to lift objects such as a cup of coffee. He also reports that the pain disrupts his sleep, causing him to wake up at night. He has been using Voltaren gel and Biofreeze alternately, which provide immediate relief. He has been managing his pain with Advil, taking two tablets up to three times a day, although he typically only requires one dose. He has previously tried meloxicam. He is currently on a six-day course of prednisone, taking two tablets daily, and doxycycline for bronchitis. He finds some relief from sleeping on his left arm and placing his hand on his shoulder, but this is currently not possible due to congestion. He underwent rotator cuff repair in 2001.    MEDICATIONS  Current: Advil, prednisone, doxycycline  Past: meloxicam    No Known Allergies  Past Medical History:   Diagnosis Date    Hyperlipidemia      Past Surgical History:   Procedure Laterality Date    FOOT SURGERY

## 2025-02-03 RX ORDER — ACETAMINOPHEN 325 MG/1
TABLET ORAL
COMMUNITY

## 2025-02-03 RX ORDER — DEXTROMETHORPHAN POLISTIREX 30 MG/5ML
SUSPENSION ORAL
COMMUNITY

## 2025-02-03 RX ORDER — DOXYCYCLINE 100 MG/1
CAPSULE ORAL
COMMUNITY
Start: 2025-01-25

## 2025-02-03 RX ORDER — BROMPHENIRAMINE MALEATE, PSEUDOEPHEDRINE HYDROCHLORIDE, AND DEXTROMETHORPHAN HYDROBROMIDE 2; 30; 10 MG/5ML; MG/5ML; MG/5ML
SYRUP ORAL EVERY 4 HOURS PRN
COMMUNITY
Start: 2024-04-29

## 2025-02-03 RX ORDER — ALBUTEROL SULFATE 90 UG/1
2 INHALANT RESPIRATORY (INHALATION) EVERY 6 HOURS PRN
COMMUNITY
Start: 2025-01-25

## 2025-02-03 RX ORDER — PREDNISONE 20 MG/1
TABLET ORAL DAILY
COMMUNITY
Start: 2025-01-25

## 2025-02-04 ENCOUNTER — OFFICE VISIT (OUTPATIENT)
Dept: ORTHOPEDIC SURGERY | Age: 70
End: 2025-02-04
Payer: MEDICARE

## 2025-02-04 DIAGNOSIS — M25.511 RIGHT SHOULDER PAIN, UNSPECIFIED CHRONICITY: ICD-10-CM

## 2025-02-04 DIAGNOSIS — M67.921 TENDINOPATHY OF RIGHT BICEPS TENDON: Primary | ICD-10-CM

## 2025-02-04 PROCEDURE — G8417 CALC BMI ABV UP PARAM F/U: HCPCS | Performed by: ORTHOPAEDIC SURGERY

## 2025-02-04 PROCEDURE — 3017F COLORECTAL CA SCREEN DOC REV: CPT | Performed by: ORTHOPAEDIC SURGERY

## 2025-02-04 PROCEDURE — 99214 OFFICE O/P EST MOD 30 MIN: CPT | Performed by: ORTHOPAEDIC SURGERY

## 2025-02-04 PROCEDURE — G8428 CUR MEDS NOT DOCUMENT: HCPCS | Performed by: ORTHOPAEDIC SURGERY

## 2025-02-04 PROCEDURE — 1036F TOBACCO NON-USER: CPT | Performed by: ORTHOPAEDIC SURGERY

## 2025-02-04 PROCEDURE — 1123F ACP DISCUSS/DSCN MKR DOCD: CPT | Performed by: ORTHOPAEDIC SURGERY

## 2025-02-04 NOTE — PROGRESS NOTES
Name: Fredy Lezama  YOB: 1955  Gender: male  MRN: [id number]    CC:   Chief Complaint   Patient presents with    Follow-up     R Shoulder MRI Results         History of Present Illness  The patient is a 69-year-old male who presents for right shoulder evaluation.    He reports no new developments in his shoulder condition since his last consultation with Ludy, during which he received an injection. He has been managing his pain with Voltaren and Aleve, which have provided some relief. The pain, described as achy, radiates downwards when it originates from the back of the shoulder. He also experiences pain in the front of the shoulder. His pain is not constant and varies between the anterior and posterior aspects of the shoulder, depending on the day. He recalls an incident in early 11/2024, prior to a trip to [place], where he slipped on the stairs due to his bunion surgery, resulting in a fall where he landed on his elbow and jammed his shoulder. This incident occurred the day before his flight, and he experienced difficulty wearing a backpack for about a week. He suspected a dislocation but did not seek immediate medical attention. After a week, his condition improved. He reported this incident to Ludy, who expressed concern about a potential tear. He received an injection almost a year ago, which was beneficial during his skiing trip and motorcycle ride to [place]. However, he experienced a recurrence of symptoms in 09/2024 and 10/2024 following a fall. He also reports difficulty using TRX straps at the gym this morning, particularly with pulling and rotating movements. He has noticed a difference in strength between his arms. He has been sleeping on his side, with his hand on his biceps, to alleviate the pain. He is considering postponing any surgical intervention for 6 months due to yard work commitments. He has been managing his pain with Voltaren and Aleve, which have provided some relief.

## 2025-02-11 DIAGNOSIS — M67.921 TENDINOPATHY OF RIGHT BICEPS TENDON: Primary | ICD-10-CM

## 2025-02-11 DIAGNOSIS — M25.511 RIGHT SHOULDER PAIN, UNSPECIFIED CHRONICITY: ICD-10-CM

## 2025-03-14 DIAGNOSIS — M67.921 TENDINOPATHY OF RIGHT BICEPS TENDON: Primary | ICD-10-CM

## 2025-03-14 DIAGNOSIS — M25.511 RIGHT SHOULDER PAIN, UNSPECIFIED CHRONICITY: ICD-10-CM

## 2025-04-22 DIAGNOSIS — Z09 SURGERY FOLLOW-UP: Primary | ICD-10-CM

## 2025-04-22 RX ORDER — ASPIRIN 325 MG
325 TABLET ORAL DAILY
Qty: 7 TABLET | Refills: 0 | Status: SHIPPED | OUTPATIENT
Start: 2025-05-12 | End: 2025-05-19

## 2025-04-22 RX ORDER — MELOXICAM 7.5 MG/1
7.5 TABLET ORAL 2 TIMES DAILY
Qty: 28 TABLET | Refills: 0 | Status: SHIPPED | OUTPATIENT
Start: 2025-05-12 | End: 2025-05-26

## 2025-04-22 RX ORDER — ONDANSETRON 8 MG/1
4 TABLET, ORALLY DISINTEGRATING ORAL EVERY 6 HOURS
Qty: 16 TABLET | Refills: 0 | Status: SHIPPED | OUTPATIENT
Start: 2025-05-12

## 2025-04-22 RX ORDER — OXYCODONE AND ACETAMINOPHEN 7.5; 325 MG/1; MG/1
1-2 TABLET ORAL
Qty: 36 TABLET | Refills: 0 | Status: SHIPPED | OUTPATIENT
Start: 2025-05-12 | End: 2025-05-15

## 2025-04-22 RX ORDER — AMOXICILLIN 250 MG
1 CAPSULE ORAL DAILY
Qty: 21 TABLET | Refills: 0 | Status: SHIPPED | OUTPATIENT
Start: 2025-05-12

## 2025-04-28 DIAGNOSIS — M67.921 TENDINOPATHY OF RIGHT BICEPS TENDON: Primary | ICD-10-CM

## 2025-04-28 DIAGNOSIS — M25.511 RIGHT SHOULDER PAIN, UNSPECIFIED CHRONICITY: ICD-10-CM

## 2025-05-01 RX ORDER — COVID-19 ANTIGEN TEST
2 KIT MISCELLANEOUS 2 TIMES DAILY PRN
COMMUNITY

## 2025-05-01 NOTE — PERIOP NOTE
Patient verified name and .  Order for consent  was found in EHR and does match case posting; patient verifies procedure.   Type 1B surgery, phone assessment complete.  Orders  received.  Labs per surgeon: none ordered  Labs per anesthesia protocol: not indicated    Patient answered medical/surgical history questions at their best of ability. All prior to admission medications documented in EPIC.    Patient instructed to continue taking all prescription medications up to the day of surgery but to take only the following medications the day of surgery according to anesthesia guidelines with a small sip of water: atorvastatin, valacyclovir if needed.   Also, patient is requested to take 2 Tylenol in the morning and then again before bed on the day before surgery. Regular or extra strength may be used.       Patient informed that all vitamins and supplements should be held 7 days prior to surgery and NSAIDS (aleve, voltaren gel) 5 days prior to surgery. Prescription meds to hold: viagra hold 24 hours prior to surgery    Patient instructed on the following:    > Arrive at [facility] Entr[facility], time of arrival to be called the day before by 1700  > No food after midnight, patient may drink clear liquids up until 2 hours prior to arrival. No gum, candy, mints.   > Responsible adult must drive patient to the hospital, stay during surgery, and patient will need supervision 24 hours after anesthesia  > Use non moisturizing soap in shower the night before surgery and on the morning of surgery  > All piercings must be removed prior to arrival.    > Leave all valuables (money and jewelry) at home but bring insurance card and ID on DOS.   > You may be required to pay a deductible or co-pay on the day of your procedure. You can pre-pay by calling [phone] if your surgery is at the [facility] or [phone] if your surgery is at the [facility].  > Do not wear make-up, nail polish, lotions, cologne, perfumes, powders, or oil on

## 2025-05-05 ENCOUNTER — OFFICE VISIT (OUTPATIENT)
Dept: ORTHOPEDIC SURGERY | Age: 70
End: 2025-05-05

## 2025-05-05 DIAGNOSIS — M25.511 RIGHT SHOULDER PAIN, UNSPECIFIED CHRONICITY: ICD-10-CM

## 2025-05-05 DIAGNOSIS — M67.921 TENDINOPATHY OF RIGHT BICEPS TENDON: Primary | ICD-10-CM

## 2025-05-05 NOTE — PROGRESS NOTES
Patient was fitted and instructed on the use of a size M Ultrasling for the patient's right shoulder.   I provided the following instructions along with proper fitting as noted below.   Fitting instructions included   How to adjusting the thumb support and avoiding irritation to hand. Patient was instructed this should not be used until the block given at surgery has worn off and patient has regained feeling in hand.   How to manage the quick release connection.   The shoulder straps are adjusted to insure correct fit including trimming any excess material.   The shoulder strap crosses over the opposite shoulder being sure to avoid excess pull on neck  Placement of pillow placed at the waist line of the affected shoulder with the Velcro facing away from body allowing for sling attachment.   Positioning the elbow in sling as far back as possible providing adequate support  Keeping the forearm close to the body preventing excessive ER   Keeping the hand higher than the elbow to allow for adequate support of arm in sling and avoiding excess swelling to hand.   How to detach the shoulder strap saar and open front panel to allow for proper hygiene.  Patient was informed waist belt should stay in place at all times unless told otherwise by the Doctor or Physical Therapist.   Patient was also made aware to bring an oversized shirt to surgery to provide coverage and comfort when leaving the hospital.   The patient was instructed to bring ultrasling, oversized shirt, and iceman pad (if purchased or prescribed) with them on the day of surgery.   Patient was fitted and instructed on the use of an Ultrasling for the patients right shoulder. I fitted patient with a size M ultrasling. Patient was instructed to position elbow in sling as far back as possible and that the arm should be internally rotated lying nicely against abdomen. I demonstrated how the shoulder strap crosses over the opposite shoulder and connects to the

## 2025-05-09 NOTE — PERIOP NOTE
Preop department called to notify patient of arrival time for scheduled procedure. Instructions given to   - Arrive at [facility] Entr[facility] [address].  - Nothing to eat after midnight unless otherwise indicated. No gum, mints, or ice chips. You may have clear liquids two hours prior to arrival to the hospital.   - Have a responsible adult to drive patient to the hospital, stay during surgery, and patient will need supervision 24 hours after anesthesia.   - Use antibacterial soap in shower the night before surgery and on the morning of surgery.       Was patient contacted: yes, pt  Voicemail left: n/a  Numbers contacted: [phone]   Arrival time: 0645  Time to stop clear liquids: 0445

## 2025-05-09 NOTE — DISCHARGE INSTRUCTIONS
Rotator Cuff Repair Postoperative Instructions    Returning Home  Your pain after surgery will vary depending on the method of anesthesia used and from patient to patient. In the first 24 hours, pain medication should be taken regularly with small amounts of food. During this time, nausea and light-headedness are common and should improve in 2-5 days. Drinking fluids may help. If nausea persists, medicine can be prescribed by calling your doctor at [phone].    Leaving the Outpatient Surgery Center:  As you leave the surgery center, you will be in a sling and swath. The sling will have a pillow with it holding your arm away from your body slightly. Plan on wearing the sling for 4-6 weeks after surgery. Make sure that you have a large shirt or a button up shirt to wear home.    For the first week:  Sleeping and resting will be more comfortable if you are propped up in bed or have access to a recliner.  Ice your shoulder to help manage the pain. 30 minutes of ice every hour as needed.  You may come out of the sling 3-5x/day to do elbow, wrist and hand range of motion, and other home exercises (listed further down in - Home Excersizes) so your other joints do not get stiff. Just do not activate or use your shoulder muscles!  Sleep with your sling on.    Sling Use  You will be in the sling for 4 to 6 weeks. You may take the sling off to do your home exercises or physical therapy, or shower, but you need to wear the sling at all other times, even SLEEPING. To put the sling on:    Make sure the pillow of the sling is snug against your side and that your hand and elbow are parallel to the floor.  One strap will go around your waist and connect to the pillow.      Care of Your Incisions  Incisions and stitches are often checked/removed 6 to 10 days after surgery.  Moderate bleeding may occur at the incision sites. This should decrease quickly over time.  Leave the dressings from surgery in place for 48 to 72

## 2025-05-12 ENCOUNTER — ANESTHESIA (OUTPATIENT)
Dept: SURGERY | Age: 70
End: 2025-05-12
Payer: MEDICARE

## 2025-05-12 ENCOUNTER — HOSPITAL ENCOUNTER (OUTPATIENT)
Age: 70
Setting detail: OUTPATIENT SURGERY
Discharge: HOME | End: 2025-05-12
Attending: ORTHOPAEDIC SURGERY | Admitting: ORTHOPAEDIC SURGERY
Payer: MEDICARE

## 2025-05-12 ENCOUNTER — ANESTHESIA EVENT (OUTPATIENT)
Dept: SURGERY | Age: 70
End: 2025-05-12
Payer: MEDICARE

## 2025-05-12 VITALS
WEIGHT: 232 LBS | OXYGEN SATURATION: 91 % | BODY MASS INDEX: 35.16 KG/M2 | HEIGHT: 68 IN | RESPIRATION RATE: 18 BRPM | DIASTOLIC BLOOD PRESSURE: 76 MMHG | HEART RATE: 59 BPM | SYSTOLIC BLOOD PRESSURE: 131 MMHG | TEMPERATURE: 98.3 F

## 2025-05-12 PROCEDURE — 2500000003 HC RX 250 WO HCPCS: Performed by: NURSE ANESTHETIST, CERTIFIED REGISTERED

## 2025-05-12 PROCEDURE — 6370000000 HC RX 637 (ALT 250 FOR IP): Performed by: ANESTHESIOLOGY

## 2025-05-12 PROCEDURE — 3700000001 HC ADD 15 MINUTES (ANESTHESIA): Performed by: ORTHOPAEDIC SURGERY

## 2025-05-12 PROCEDURE — 2709999900 HC NON-CHARGEABLE SUPPLY: Performed by: ORTHOPAEDIC SURGERY

## 2025-05-12 PROCEDURE — 29827 SHO ARTHRS SRG RT8TR CUF RPR: CPT | Performed by: ORTHOPAEDIC SURGERY

## 2025-05-12 PROCEDURE — 23430 REPAIR BICEPS TENDON: CPT | Performed by: ORTHOPAEDIC SURGERY

## 2025-05-12 PROCEDURE — 2580000003 HC RX 258: Performed by: ANESTHESIOLOGY

## 2025-05-12 PROCEDURE — 6360000002 HC RX W HCPCS: Performed by: PHYSICIAN ASSISTANT

## 2025-05-12 PROCEDURE — 3700000000 HC ANESTHESIA ATTENDED CARE: Performed by: ORTHOPAEDIC SURGERY

## 2025-05-12 PROCEDURE — 6360000002 HC RX W HCPCS: Performed by: ORTHOPAEDIC SURGERY

## 2025-05-12 PROCEDURE — 23430 REPAIR BICEPS TENDON: CPT | Performed by: PHYSICIAN ASSISTANT

## 2025-05-12 PROCEDURE — 29827 SHO ARTHRS SRG RT8TR CUF RPR: CPT | Performed by: PHYSICIAN ASSISTANT

## 2025-05-12 PROCEDURE — 3600000014 HC SURGERY LEVEL 4 ADDTL 15MIN: Performed by: ORTHOPAEDIC SURGERY

## 2025-05-12 PROCEDURE — 2780000004 HC MISC SCREW $251-500: Performed by: ORTHOPAEDIC SURGERY

## 2025-05-12 PROCEDURE — 6360000002 HC RX W HCPCS: Performed by: NURSE ANESTHETIST, CERTIFIED REGISTERED

## 2025-05-12 PROCEDURE — 7100000010 HC PHASE II RECOVERY - FIRST 15 MIN: Performed by: ORTHOPAEDIC SURGERY

## 2025-05-12 PROCEDURE — 6360000002 HC RX W HCPCS: Performed by: ANESTHESIOLOGY

## 2025-05-12 PROCEDURE — 64415 NJX AA&/STRD BRCH PLXS IMG: CPT | Performed by: ANESTHESIOLOGY

## 2025-05-12 PROCEDURE — 7100000011 HC PHASE II RECOVERY - ADDTL 15 MIN: Performed by: ORTHOPAEDIC SURGERY

## 2025-05-12 PROCEDURE — 3600000004 HC SURGERY LEVEL 4 BASE: Performed by: ORTHOPAEDIC SURGERY

## 2025-05-12 PROCEDURE — 2500000003 HC RX 250 WO HCPCS: Performed by: ANESTHESIOLOGY

## 2025-05-12 PROCEDURE — 7100000000 HC PACU RECOVERY - FIRST 15 MIN: Performed by: ORTHOPAEDIC SURGERY

## 2025-05-12 PROCEDURE — 2720000010 HC SURG SUPPLY STERILE: Performed by: ORTHOPAEDIC SURGERY

## 2025-05-12 PROCEDURE — C1713 ANCHOR/SCREW BN/BN,TIS/BN: HCPCS | Performed by: ORTHOPAEDIC SURGERY

## 2025-05-12 PROCEDURE — 7100000001 HC PACU RECOVERY - ADDTL 15 MIN: Performed by: ORTHOPAEDIC SURGERY

## 2025-05-12 DEVICE — HEALICOIL REGENESORB 4.75 MM                                    SUTURE ANCHOR WITH ONE ULTRATAPE                                    SUTURE COBRAID BLUE AND ONE                                    ULTRABRAID SUTURE NO.2
Type: IMPLANTABLE DEVICE | Site: SHOULDER | Status: FUNCTIONAL
Brand: HEALICOIL REGENESORB

## 2025-05-12 DEVICE — HEALICOIL REGENESORB 4.75 MM                                    SUTURE ANCHOR WITH ONE ULTRATAPE                                    SUTURE BLUE AND ONE NO.2 ULTRABRAID SUTURE
Type: IMPLANTABLE DEVICE | Site: SHOULDER | Status: FUNCTIONAL
Brand: HEALICOIL REGENESORB

## 2025-05-12 DEVICE — HEALICOIL KNOTLESS PK  NST
Type: IMPLANTABLE DEVICE | Site: SHOULDER | Status: FUNCTIONAL
Brand: HEALICOIL

## 2025-05-12 RX ORDER — NEOSTIGMINE METHYLSULFATE 1 MG/ML
INJECTION, SOLUTION INTRAVENOUS
Status: DISCONTINUED | OUTPATIENT
Start: 2025-05-12 | End: 2025-05-12 | Stop reason: SDUPTHER

## 2025-05-12 RX ORDER — ROCURONIUM BROMIDE 10 MG/ML
INJECTION, SOLUTION INTRAVENOUS
Status: DISCONTINUED | OUTPATIENT
Start: 2025-05-12 | End: 2025-05-12 | Stop reason: SDUPTHER

## 2025-05-12 RX ORDER — SODIUM CHLORIDE, SODIUM LACTATE, POTASSIUM CHLORIDE, CALCIUM CHLORIDE 600; 310; 30; 20 MG/100ML; MG/100ML; MG/100ML; MG/100ML
INJECTION, SOLUTION INTRAVENOUS CONTINUOUS
Status: DISCONTINUED | OUTPATIENT
Start: 2025-05-12 | End: 2025-05-12 | Stop reason: HOSPADM

## 2025-05-12 RX ORDER — DEXAMETHASONE SODIUM PHOSPHATE 4 MG/ML
INJECTION, SOLUTION INTRA-ARTICULAR; INTRALESIONAL; INTRAMUSCULAR; INTRAVENOUS; SOFT TISSUE
Status: DISCONTINUED | OUTPATIENT
Start: 2025-05-12 | End: 2025-05-12 | Stop reason: SDUPTHER

## 2025-05-12 RX ORDER — TRANEXAMIC ACID 100 MG/ML
INJECTION, SOLUTION INTRAVENOUS
Status: DISCONTINUED | OUTPATIENT
Start: 2025-05-12 | End: 2025-05-12 | Stop reason: SDUPTHER

## 2025-05-12 RX ORDER — SODIUM CHLORIDE 0.9 % (FLUSH) 0.9 %
5-40 SYRINGE (ML) INJECTION PRN
Status: DISCONTINUED | OUTPATIENT
Start: 2025-05-12 | End: 2025-05-12 | Stop reason: HOSPADM

## 2025-05-12 RX ORDER — EPHEDRINE SULFATE 5 MG/ML
INJECTION INTRAVENOUS
Status: DISCONTINUED | OUTPATIENT
Start: 2025-05-12 | End: 2025-05-12 | Stop reason: SDUPTHER

## 2025-05-12 RX ORDER — LIDOCAINE HYDROCHLORIDE 10 MG/ML
1 INJECTION, SOLUTION INFILTRATION; PERINEURAL
Status: DISCONTINUED | OUTPATIENT
Start: 2025-05-12 | End: 2025-05-12 | Stop reason: HOSPADM

## 2025-05-12 RX ORDER — SODIUM CHLORIDE 0.9 % (FLUSH) 0.9 %
5-40 SYRINGE (ML) INJECTION EVERY 12 HOURS SCHEDULED
Status: DISCONTINUED | OUTPATIENT
Start: 2025-05-12 | End: 2025-05-12 | Stop reason: HOSPADM

## 2025-05-12 RX ORDER — GLYCOPYRROLATE 0.2 MG/ML
INJECTION INTRAMUSCULAR; INTRAVENOUS
Status: DISCONTINUED | OUTPATIENT
Start: 2025-05-12 | End: 2025-05-12 | Stop reason: SDUPTHER

## 2025-05-12 RX ORDER — SODIUM CHLORIDE 9 MG/ML
INJECTION, SOLUTION INTRAVENOUS PRN
Status: DISCONTINUED | OUTPATIENT
Start: 2025-05-12 | End: 2025-05-12 | Stop reason: HOSPADM

## 2025-05-12 RX ORDER — OXYCODONE HYDROCHLORIDE 5 MG/1
5 TABLET ORAL
Status: COMPLETED | OUTPATIENT
Start: 2025-05-12 | End: 2025-05-12

## 2025-05-12 RX ORDER — ONDANSETRON 2 MG/ML
INJECTION INTRAMUSCULAR; INTRAVENOUS
Status: DISCONTINUED | OUTPATIENT
Start: 2025-05-12 | End: 2025-05-12 | Stop reason: SDUPTHER

## 2025-05-12 RX ORDER — NALOXONE HYDROCHLORIDE 0.4 MG/ML
INJECTION, SOLUTION INTRAMUSCULAR; INTRAVENOUS; SUBCUTANEOUS PRN
Status: DISCONTINUED | OUTPATIENT
Start: 2025-05-12 | End: 2025-05-12 | Stop reason: HOSPADM

## 2025-05-12 RX ORDER — MIDAZOLAM HYDROCHLORIDE 2 MG/2ML
2 INJECTION, SOLUTION INTRAMUSCULAR; INTRAVENOUS
Status: COMPLETED | OUTPATIENT
Start: 2025-05-12 | End: 2025-05-12

## 2025-05-12 RX ORDER — PROPOFOL 10 MG/ML
INJECTION, EMULSION INTRAVENOUS
Status: DISCONTINUED | OUTPATIENT
Start: 2025-05-12 | End: 2025-05-12 | Stop reason: SDUPTHER

## 2025-05-12 RX ORDER — ONDANSETRON 2 MG/ML
4 INJECTION INTRAMUSCULAR; INTRAVENOUS
Status: COMPLETED | OUTPATIENT
Start: 2025-05-12 | End: 2025-05-12

## 2025-05-12 RX ORDER — LIDOCAINE HYDROCHLORIDE 20 MG/ML
INJECTION, SOLUTION EPIDURAL; INFILTRATION; INTRACAUDAL; PERINEURAL
Status: DISCONTINUED | OUTPATIENT
Start: 2025-05-12 | End: 2025-05-12 | Stop reason: SDUPTHER

## 2025-05-12 RX ORDER — EPINEPHRINE 1 MG/ML(1)
AMPUL (ML) INJECTION PRN
Status: DISCONTINUED | OUTPATIENT
Start: 2025-05-12 | End: 2025-05-12 | Stop reason: ALTCHOICE

## 2025-05-12 RX ORDER — HALOPERIDOL 5 MG/ML
1 INJECTION INTRAMUSCULAR
Status: DISCONTINUED | OUTPATIENT
Start: 2025-05-12 | End: 2025-05-12 | Stop reason: HOSPADM

## 2025-05-12 RX ORDER — BUPIVACAINE HYDROCHLORIDE AND EPINEPHRINE 5; 5 MG/ML; UG/ML
INJECTION, SOLUTION EPIDURAL; INTRACAUDAL; PERINEURAL
Status: DISCONTINUED | OUTPATIENT
Start: 2025-05-12 | End: 2025-05-12 | Stop reason: SDUPTHER

## 2025-05-12 RX ORDER — IPRATROPIUM BROMIDE AND ALBUTEROL SULFATE 2.5; .5 MG/3ML; MG/3ML
1 SOLUTION RESPIRATORY (INHALATION)
Status: DISCONTINUED | OUTPATIENT
Start: 2025-05-12 | End: 2025-05-12 | Stop reason: HOSPADM

## 2025-05-12 RX ORDER — BUPIVACAINE HYDROCHLORIDE 5 MG/ML
INJECTION, SOLUTION EPIDURAL; INTRACAUDAL; PERINEURAL
Status: DISCONTINUED | OUTPATIENT
Start: 2025-05-12 | End: 2025-05-12 | Stop reason: SDUPTHER

## 2025-05-12 RX ORDER — PHENYLEPHRINE HYDROCHLORIDE 10 MG/ML
INJECTION INTRAVENOUS
Status: DISCONTINUED | OUTPATIENT
Start: 2025-05-12 | End: 2025-05-12 | Stop reason: SDUPTHER

## 2025-05-12 RX ORDER — FENTANYL CITRATE 50 UG/ML
100 INJECTION, SOLUTION INTRAMUSCULAR; INTRAVENOUS
Status: COMPLETED | OUTPATIENT
Start: 2025-05-12 | End: 2025-05-12

## 2025-05-12 RX ORDER — FENTANYL CITRATE 50 UG/ML
50 INJECTION, SOLUTION INTRAMUSCULAR; INTRAVENOUS EVERY 5 MIN PRN
Status: DISCONTINUED | OUTPATIENT
Start: 2025-05-12 | End: 2025-05-12 | Stop reason: HOSPADM

## 2025-05-12 RX ORDER — ACETAMINOPHEN 500 MG
1000 TABLET ORAL ONCE
Status: COMPLETED | OUTPATIENT
Start: 2025-05-12 | End: 2025-05-12

## 2025-05-12 RX ADMIN — ROCURONIUM BROMIDE 30 MG: 10 INJECTION, SOLUTION INTRAVENOUS at 10:40

## 2025-05-12 RX ADMIN — SODIUM CHLORIDE, SODIUM LACTATE, POTASSIUM CHLORIDE, AND CALCIUM CHLORIDE: 600; 310; 30; 20 INJECTION, SOLUTION INTRAVENOUS at 09:00

## 2025-05-12 RX ADMIN — EPHEDRINE SULFATE 5 MG: 5 INJECTION INTRAVENOUS at 11:47

## 2025-05-12 RX ADMIN — FENTANYL CITRATE 100 MCG: 50 INJECTION INTRAMUSCULAR; INTRAVENOUS at 09:12

## 2025-05-12 RX ADMIN — PHENYLEPHRINE HYDROCHLORIDE 100 MCG: 10 INJECTION INTRAVENOUS at 11:35

## 2025-05-12 RX ADMIN — GLYCOPYRROLATE 0.4 MG: 0.2 INJECTION INTRAMUSCULAR; INTRAVENOUS at 11:55

## 2025-05-12 RX ADMIN — ACETAMINOPHEN 1000 MG: 500 TABLET, FILM COATED ORAL at 09:00

## 2025-05-12 RX ADMIN — EPHEDRINE SULFATE 5 MG: 5 INJECTION INTRAVENOUS at 11:31

## 2025-05-12 RX ADMIN — Medication 2000 MG: at 10:50

## 2025-05-12 RX ADMIN — LIDOCAINE HYDROCHLORIDE 100 MG: 20 INJECTION, SOLUTION EPIDURAL; INFILTRATION; INTRACAUDAL; PERINEURAL at 10:40

## 2025-05-12 RX ADMIN — PROPOFOL 200 MG: 10 INJECTION, EMULSION INTRAVENOUS at 10:40

## 2025-05-12 RX ADMIN — ONDANSETRON 4 MG: 2 INJECTION, SOLUTION INTRAMUSCULAR; INTRAVENOUS at 12:44

## 2025-05-12 RX ADMIN — PHENYLEPHRINE HYDROCHLORIDE 50 MCG: 10 INJECTION INTRAVENOUS at 10:49

## 2025-05-12 RX ADMIN — PHENYLEPHRINE HYDROCHLORIDE 50 MCG: 10 INJECTION INTRAVENOUS at 11:21

## 2025-05-12 RX ADMIN — EPHEDRINE SULFATE 2.5 MG: 5 INJECTION INTRAVENOUS at 11:12

## 2025-05-12 RX ADMIN — OXYCODONE HYDROCHLORIDE 5 MG: 5 TABLET ORAL at 12:44

## 2025-05-12 RX ADMIN — ONDANSETRON 4 MG: 2 INJECTION INTRAMUSCULAR; INTRAVENOUS at 11:52

## 2025-05-12 RX ADMIN — PHENYLEPHRINE HYDROCHLORIDE 100 MCG: 10 INJECTION INTRAVENOUS at 10:41

## 2025-05-12 RX ADMIN — PHENYLEPHRINE HYDROCHLORIDE 50 MCG: 10 INJECTION INTRAVENOUS at 11:50

## 2025-05-12 RX ADMIN — DEXAMETHASONE SODIUM PHOSPHATE 4 MG: 4 INJECTION, SOLUTION INTRA-ARTICULAR; INTRALESIONAL; INTRAMUSCULAR; INTRAVENOUS; SOFT TISSUE at 09:12

## 2025-05-12 RX ADMIN — TRANEXAMIC ACID 1000 MG: 100 INJECTION, SOLUTION INTRAVENOUS at 10:54

## 2025-05-12 RX ADMIN — DEXAMETHASONE SODIUM PHOSPHATE 4 MG: 4 INJECTION INTRA-ARTICULAR; INTRALESIONAL; INTRAMUSCULAR; INTRAVENOUS; SOFT TISSUE at 10:59

## 2025-05-12 RX ADMIN — SODIUM CHLORIDE, SODIUM LACTATE, POTASSIUM CHLORIDE, AND CALCIUM CHLORIDE: 600; 310; 30; 20 INJECTION, SOLUTION INTRAVENOUS at 11:26

## 2025-05-12 RX ADMIN — BUPIVACAINE HYDROCHLORIDE 7.5 ML: 5 INJECTION, SOLUTION EPIDURAL; INTRACAUDAL; PERINEURAL at 09:12

## 2025-05-12 RX ADMIN — BUPIVACAINE HYDROCHLORIDE AND EPINEPHRINE BITARTRATE 7.5 ML: 5; .005 INJECTION, SOLUTION EPIDURAL; INTRACAUDAL; PERINEURAL at 09:12

## 2025-05-12 RX ADMIN — EPHEDRINE SULFATE 2.5 MG: 5 INJECTION INTRAVENOUS at 11:24

## 2025-05-12 RX ADMIN — MIDAZOLAM HYDROCHLORIDE 2 MG: 1 INJECTION, SOLUTION INTRAMUSCULAR; INTRAVENOUS at 09:12

## 2025-05-12 RX ADMIN — PHENYLEPHRINE HYDROCHLORIDE 50 MCG: 10 INJECTION INTRAVENOUS at 11:08

## 2025-05-12 RX ADMIN — PHENYLEPHRINE HYDROCHLORIDE 50 MCG: 10 INJECTION INTRAVENOUS at 11:07

## 2025-05-12 RX ADMIN — Medication 4 MG: at 11:55

## 2025-05-12 ASSESSMENT — PAIN SCALES - GENERAL
PAINLEVEL_OUTOF10: 0
PAINLEVEL_OUTOF10: 4
PAINLEVEL_OUTOF10: 2

## 2025-05-12 ASSESSMENT — PAIN - FUNCTIONAL ASSESSMENT: PAIN_FUNCTIONAL_ASSESSMENT: 0-10

## 2025-05-12 NOTE — ANESTHESIA PRE PROCEDURE
Department of Anesthesiology  Preprocedure Note       Name:  Fredy Lezama   Age:  69 y.o.  :  1955                                          MRN:  [id number]         Date:  2025      Surgeon: Surgeon(s):  FÁTIMA Caba MD    Procedure: Procedure(s):  RIGHT SHOULDER ARTHROSCOPY ROTATOR CUFF REPAIR    BEACH CHAIR    REGIONAL BLOCK  RIGHT SHOULDER OPEN BICEPS TENODESIS   BEACH CHAIR     REGIONAL BLOCK    Medications prior to admission:   Prior to Admission medications    Medication Sig Start Date End Date Taking? Authorizing Provider   Naproxen Sodium (ALEVE) 220 MG CAPS Take 2 tablets by mouth 2 times daily as needed for Pain   Yes Provider, MD Kassy   diclofenac sodium (VOLTAREN) 1 % GEL Apply topically 4 times daily as needed for Pain   Yes ProviderKassy MD   Multiple Vitamins-Minerals (EMERGEN-C IMMUNE PO) Take by mouth Daily   Yes ProviderKassy MD   aspirin 325 MG tablet Take 1 tablet by mouth daily for 7 days Start after surgery  Patient taking differently: Take 1 tablet by mouth daily Start after surgery- post op 25  Lobito Melissa PA   meloxicam (MOBIC) 7.5 MG tablet Take 1 tablet by mouth in the morning and at bedtime for 14 days Take 1 tablet po BID for 2 weeks for pain and inflamation  Patient taking differently: Take 1 tablet by mouth in the morning and at bedtime Take 1 tablet po BID for 2 weeks for pain and inflamation    POST OP 25  Lobito Melissa PA   naloxone (NARCAN) 4 MG/0.1ML LIQD nasal spray 1 spray by Nasal route as needed for Opioid Reversal  Patient taking differently: 1 spray by Nasal route as needed for Opioid Reversal (post op) 25   Lobito Melissa PA   oxyCODONE-acetaminophen (PERCOCET) 7.5-325 MG per tablet Take 1-2 tablets by mouth every 4-6 hours as needed for Pain for up to 3 days. Start medication night of surgery. Max Daily Amount: 12 tablets  Patient taking differently: Take 1-2 tablets by mouth every 4-6 hours as

## 2025-05-12 NOTE — ANESTHESIA POSTPROCEDURE EVALUATION
Department of Anesthesiology  Postprocedure Note    Patient: Fredy Lezama  MRN: [id number]  YOB: 1955  Date of evaluation: 5/12/2025    Procedure Summary       Date: 05/12/25 Room / Location: [facility] OP OR 07 / [facility]    Anesthesia Start: 1034 Anesthesia Stop: 1216    Procedures:       RIGHT SHOULDER ARTHROSCOPY ROTATOR CUFF REPAIR (Right: Shoulder)      RIGHT SHOULDER OPEN BICEPS TENODESIS (Right: Shoulder) Diagnosis:       Tendinopathy of right biceps tendon      Right shoulder pain, unspecified chronicity      (Tendinopathy of right biceps tendon [M67.921])      (Right shoulder pain, unspecified chronicity [M25.511])    Surgeons: FÁTIMA Caba MD Responsible Provider: Richard Owens MD    Anesthesia Type: general ASA Status: 2            Anesthesia Type: No value filed.    Rachelle Phase I: Rachelle Score: 8    Rachelle Phase II:      Anesthesia Post Evaluation    Patient location during evaluation: PACU  Patient participation: complete - patient participated  Level of consciousness: awake and alert  Airway patency: patent  Nausea & Vomiting: no nausea and no vomiting  Cardiovascular status: hemodynamically stable  Respiratory status: acceptable, nonlabored ventilation and spontaneous ventilation  Hydration status: euvolemic  Comments: BP (!) 157/74   Pulse 74   Temp 97.2 °F (36.2 °C) (Temporal)   Resp 16   Ht 1.727 m (5' 8\")   Wt 105.2 kg (232 lb)   SpO2 98%   BMI 35.28 kg/m²     Multimodal analgesia pain management approach  Pain management: adequate and satisfactory to patient    No notable events documented.

## 2025-05-12 NOTE — ADDENDUM NOTE
Addendum  created 05/12/25 1237 by Richard Owens MD    Child order released for a procedure order, Clinical Note Signed, Intraprocedure Blocks edited, SmartForm saved

## 2025-05-12 NOTE — ANESTHESIA PROCEDURE NOTES
Peripheral Block    Patient location during procedure: pre-op  Reason for block: post-op pain management and at surgeon's request  Start time: 5/12/2025 9:12 AM  End time: 5/12/2025 9:16 AM  Staffing  Performed: anesthesiologist   Anesthesiologist: Richard Owens MD  Performed by: Richard Owens MD  Authorized by: Richard Owens MD    Preanesthetic Checklist  Completed: patient identified, IV checked, site marked, risks and benefits discussed, surgical/procedural consents, equipment checked, pre-op evaluation, timeout performed, anesthesia consent given, oxygen available, monitors applied/VS acknowledged and blood product R/B/A discussed and consented  Peripheral Block   Patient position: sitting  Prep: ChloraPrep  Provider prep: sterile gloves  Patient monitoring: cardiac monitor, continuous pulse ox, frequent blood pressure checks, IV access, oxygen and responsive to questions  Block type: Brachial plexus  Interscalene  Laterality: right  Injection technique: single-shot  Guidance: ultrasound guided  Local infiltration: lidocaine  Infiltration strength: 1 %  Local infiltration: lidocaine  Dose: 3 mL    Needle   Needle type: insulated echogenic nerve stimulator needle   Needle gauge: 20 G  Needle localization: ultrasound guidance  Needle length: 5 cm  Assessment   Injection assessment: negative aspiration for heme, no paresthesia on injection, local visualized surrounding nerve on ultrasound and no intravascular symptoms  Paresthesia pain: none  Slow fractionated injection: yes  Hemodynamics: stable  Outcomes: uncomplicated    Additional Notes  Risks and benefits of block discussed prior to sedation including the most likely scenario of complete block resolution within 24h, expected ipselateral diaphragm weakness, small risk of mireya's syndrome, low chance of intravascular injection, low chance of damage to surrounding structures, unlikely ,but possible  risk of unexpected tingling, numbness or weakness

## 2025-05-12 NOTE — OP NOTE
Name: Fredy Lezama  YOB: 1955  Gender: male  MRN: [id number]    Operative Note    5/12/2025     Preoperative diagnosis: Pre-Op Diagnosis Codes:      * Tendinopathy of right biceps tendon [M67.921]     * Right shoulder pain, unspecified chronicity [M25.511]    Postoperative diagnosis: Right complete rotator cuff tear supraspinatus and infraspinatus, biceps tendon partial tear and subluxation    Surgeons and Role:     * FÁTIMA Caba MD - Primary     Assistant: JEREMÍAS Garcia, assisted during the procedure.  She was necessary for patient positioning, wound closure, and assistance with the major portions of the operation.  Her presence decreased the operative time and potential complication rate.          Anesthesia: Choice, regional block    Antibiotics: Ancef 2 grams IV    Procedures:  Procedure(s):  RIGHT SHOULDER ARTHROSCOPY ROTATOR CUFF REPAIR  RIGHT SHOULDER OPEN BICEPS TENODESIS   ( LARGE)  11458  Subpectoral biceps tenodesis [id number]      Findings:  1. EUA -full passive range of motion in comparison to contralateral side   2. Intra-articular -minimal central humeral head or glenoid cartilage arthritic change.  Subscap appeared intact.  Biceps was partially torn on the intra-articular aspect and was hypertrophied.  It was also anteriorly shifted or subluxed.  The supraspinatus and infraspinatus were completely torn and retracted.  Tissue quality was good.  3. Subacromial -minimal CA ligament fraying.  Full-thickness tear as above of supraspinatus and infraspinatus.  4. AC joint -asymptomatic    Indications / Consent:  This is a patient who has persistent pain with some weakness in the shoulder.  They have not responded to conservative measures and were desirous of evaluation and possible arthroscopic or open repair of the rotator cuff.  After previous discussions and treatments using both conservative and/or non-operative treatment options the patient elected to proceed with surgery due to

## 2025-05-16 NOTE — PROGRESS NOTES
Name: Fredy Lzeama  YOB: 1955  Gender: male  MRN: [id number]    CC:   Chief Complaint   Patient presents with    Follow-up     1st p/o R Shoulder    1-2 weeks status post   Right Shoulder Arthroscopy Rotator Cuff Repair - Right and Right Shoulder Open Biceps Tenodesis - Right  5/12/2025    HPI: Patient is status post Right Shoulder Arthroscopy Rotator Cuff Repair - Right and Right Shoulder Open Biceps Tenodesis - Right. Patient is doing well.  Patient came in with a sling on the affected side.     Review of Systems  Noncontributory      PE:  Wounds are Clean, Dry and Intact. There is no sign of infection. The patient is neurovascularly intact. Swelling is within normal limits.    A/P:     ICD-10-CM    1. Tendinopathy of right biceps tendon  M67.921         Sutures were removed and were covered with Steri-strips.   Discussed HEP.  They will continue with use of the sling at this time.       Return in about 3 weeks (around 6/13/2025).      Lux Caba MD  05/23/25

## 2025-05-20 RX ORDER — DOCUSATE SODIUM 50MG AND SENNOSIDES 8.6MG 8.6; 5 MG/1; MG/1
TABLET, FILM COATED ORAL
Qty: 21 TABLET | Refills: 0 | OUTPATIENT
Start: 2025-05-20

## 2025-05-23 ENCOUNTER — OFFICE VISIT (OUTPATIENT)
Dept: ORTHOPEDIC SURGERY | Age: 70
End: 2025-05-23

## 2025-05-23 DIAGNOSIS — M67.921 TENDINOPATHY OF RIGHT BICEPS TENDON: Primary | ICD-10-CM

## 2025-05-23 PROCEDURE — 99024 POSTOP FOLLOW-UP VISIT: CPT | Performed by: ORTHOPAEDIC SURGERY

## 2025-05-30 NOTE — PROGRESS NOTES
Name: Fredy Lezama  YOB: 1955  Gender: male  MRN: [id number]    CC:   Chief Complaint   Patient presents with    Follow-up     S/P right shoulder scope RCR(large), open biceps tenodesis DOS 5/12/25    about 1 month status post  out  Right Shoulder Arthroscopy Rotator Cuff Repair - Right and Right Shoulder Open Biceps Tenodesis - Right  5/12/2025    HPI: Patient is about one month status post large rotator cuff repair, biceps tenodesis on 5-.  Patient is doing well.  The patient came in wearing a sling on the affected limb.  He DC'd the pillow approximately 1 week ago.  [facility] and has been to 2 sessions so far.    No Known Allergies  Past Medical History:   Diagnosis Date    Hyperlipidemia      Past Surgical History:   Procedure Laterality Date    BICEPS TENDON REPAIR Right 5/12/2025    RIGHT SHOULDER OPEN BICEPS TENODESIS performed by FÁTIMA Caba MD at [facility]    FOOT SURGERY Bilateral 8/20/2024    Bilateral first metatarsophangeal cheilectomy performed by William Patel III, MD at [facility]    ROTATOR CUFF REPAIR Bilateral     SHOULDER ARTHROSCOPY Right 5/12/2025    RIGHT SHOULDER ARTHROSCOPY ROTATOR CUFF REPAIR performed by FÁTIMA Caba MD at [facility]     History reviewed. No pertinent family history.  Social History     Socioeconomic History    Marital status:      Spouse name: Not on file    Number of children: Not on file    Years of education: Not on file    Highest education level: Not on file   Occupational History    Not on file   Tobacco Use    Smoking status: Never    Smokeless tobacco: Never   Vaping Use    Vaping status: Never Used   Substance and Sexual Activity    Alcohol use: Yes     Alcohol/week: 10.0 standard drinks of alcohol     Types: 10 Drinks containing 0.5 oz of alcohol per week    Drug use: Never    Sexual activity: Not on file   Other Topics Concern    Not on file   Social History Narrative    Not on file     Social Drivers of Health

## 2025-06-03 ENCOUNTER — HOSPITAL ENCOUNTER (OUTPATIENT)
Dept: PHYSICAL THERAPY | Age: 70
Setting detail: RECURRING SERIES
Discharge: HOME | End: 2025-06-06
Attending: ORTHOPAEDIC SURGERY
Payer: MEDICARE

## 2025-06-03 DIAGNOSIS — M67.921 TENDINOPATHY OF RIGHT BICEPS TENDON: ICD-10-CM

## 2025-06-03 DIAGNOSIS — M25.511 ACUTE PAIN OF RIGHT SHOULDER: ICD-10-CM

## 2025-06-03 DIAGNOSIS — M75.121 COMPLETE TEAR OF RIGHT ROTATOR CUFF, UNSPECIFIED WHETHER TRAUMATIC: Primary | ICD-10-CM

## 2025-06-03 DIAGNOSIS — M25.611 STIFFNESS OF RIGHT SHOULDER, NOT ELSEWHERE CLASSIFIED: ICD-10-CM

## 2025-06-03 PROCEDURE — 97162 PT EVAL MOD COMPLEX 30 MIN: CPT

## 2025-06-03 PROCEDURE — 97110 THERAPEUTIC EXERCISES: CPT

## 2025-06-03 NOTE — THERAPY EVALUATION
Fredy Lezama  : 1955  Primary: Medicare Part A And B (Medicare)  Secondary: [employer] MEDICARE SUPP [facility] [address]  [place] [address]  Phone: [phone]  Fax: [phone] Plan Frequency: 1-2x/week for 90 days    Plan of Care/Certification Expiration Date: 25        Plan of Care/Certification Expiration Date:  Plan of Care/Certification Expiration Date: 25    Frequency/Duration: Plan Frequency: 1-2x/week for 90 days      Time In/Out:   Time In: 0950  Time Out: 1030      PT Visit Info:    Total # of Visits to Date: 1      Visit Count:  1                OUTPATIENT PHYSICAL THERAPY:             Initial Assessment 6/3/2025               Episode (s/p R shoulder RCR, BT)         Treatment Diagnosis:    Complete tear of right rotator cuff, unspecified whether traumatic  Tendinopathy of right biceps tendon  Acute pain of right shoulder  Stiffness of right shoulder, not elsewhere classified  Medical/Referring Diagnosis:    Tendinopathy of right biceps tendon  Right shoulder pain, unspecified chronicity      Referring Physician:  FÁTIMA Caba MD MD Orders:  PT Eval and Treat per protocol   Return MD Appt:  6/10/25  Date of Onset:  Onset Date: 25     Allergies:  Patient has no known allergies.  Restrictions/Precautions:    Post Surgical Precautions: per large rotator cuff repair protocol       Medications Last Reviewed: 6/3/2025     SUBJECTIVE   History of Injury/Illness (Reason for Referral):  He had bunion surgery in August. November he slipped down the stairs and hit his R elbow. He did have a bicep tear prior to fall and was getting injections for that. Had an MRI and then surgery. Dr. Henderson he had a previous R shoulder scope. Surgery was May 12th. No pain/minimal pain in the shoulder. He was taking his pain medication and icing the shoulder. He is no longer taking the pain medication. Occasionally throbbing pain in the shoulder.

## 2025-06-04 NOTE — PROGRESS NOTES
Fredy Lezama  : 1955  Primary: Medicare Part A And B (Medicare)  Secondary: [employer] MEDICARE SUPP [facility] [address]  [place] [address]  Phone: [phone]  Fax: [phone] Plan Frequency: 1-2x/week for 90 days    Plan of Care/Certification Expiration Date: 25        Plan of Care/Certification Expiration Date:  Plan of Care/Certification Expiration Date: 25    Frequency/Duration: Plan Frequency: 1-2x/week for 90 days      Time In/Out:   Time In: 0950  Time Out: 1030      PT Visit Info:    Total # of Visits to Date: 1      Visit Count:  1    OUTPATIENT PHYSICAL THERAPY:   Treatment Note 6/3/2025       Episode  (s/p R shoulder RCR, BT)               Treatment Diagnosis:    Complete tear of right rotator cuff, unspecified whether traumatic  Tendinopathy of right biceps tendon  Acute pain of right shoulder  Stiffness of right shoulder, not elsewhere classified  Medical/Referring Diagnosis:    Tendinopathy of right biceps tendon  Right shoulder pain, unspecified chronicity      Referring Physician:  FÁTIMA Caba MD MD Orders:  PT Eval and Treat per protocol  Return MD Appt:  6/10/25   Date of Onset:  Onset Date: 25     Allergies:   Patient has no known allergies.  Restrictions/Precautions:   Post Surgical Precautions:   per large rotator cuff repair protocol       Interventions Planned (Treatment may consist of any combination of the following):  Current Treatment Recommendations: Strengthening; ROM; Manual; Modalities    See Assessment Note    Subjective Comments:   See Eval  Initial Pain Level:   0   /10  Post Session Pain Level:   0    /10  Medications Last Reviewed: 6/3/2025  Updated Objective Findings:  See Evaluation Note from today  Treatment   THERAPEUTIC EXERCISE: (10 minutes):    Exercises to improve R shoulder mobility and ROM. Pt supine and PROM to the R shoulder for ER at neutral, IR at neutral, abduction, and forward elevation.

## 2025-06-06 ENCOUNTER — HOSPITAL ENCOUNTER (OUTPATIENT)
Dept: PHYSICAL THERAPY | Age: 70
Setting detail: RECURRING SERIES
Discharge: HOME | End: 2025-06-09
Attending: ORTHOPAEDIC SURGERY
Payer: MEDICARE

## 2025-06-06 PROCEDURE — 97110 THERAPEUTIC EXERCISES: CPT

## 2025-06-06 ASSESSMENT — PAIN SCALES - GENERAL: PAINLEVEL_OUTOF10: 0

## 2025-06-06 NOTE — PROGRESS NOTES
Fredy Lezama  : 1955  Primary: Medicare Part A And B (Medicare)  Secondary: [employer] MEDIC[employer] SUPP [facility] [address] [facility]  [place] [address]  Phone: [phone]  Fax: [phone] Plan Frequency: 1-2x/week for 90 days    Plan of Care/Certification Expiration Date: 25        Plan of Care/Certification Expiration Date:  Plan of Care/Certification Expiration Date: 25    Frequency/Duration: Plan Frequency: 1-2x/week for 90 days      Time In/Out:   Time In: 1018  Time Out: 1045      PT Visit Info:    Total # of Visits to Date: 2  Progress Note Counter: 2      Visit Count:  2    OUTPATIENT PHYSICAL THERAPY:   Treatment Note 2025       Episode  (s/p R shoulder RCR, BT)               Treatment Diagnosis:    Complete tear of right rotator cuff, unspecified whether traumatic  Tendinopathy of right biceps tendon  Acute pain of right shoulder  Stiffness of right shoulder, not elsewhere classified  Medical/Referring Diagnosis:    Tendinopathy of right biceps tendon  Right shoulder pain, unspecified chronicity  Referring Physician:  FÁTIMA Caba MD MD Orders:  PT Eval and Treat per protocol  Return MD Appt:  6/10/25   Date of Onset:  Onset Date: 25     Allergies: Patient has no known allergies.  Restrictions/Precautions:   Post Surgical Precautions:   per large rotator cuff repair protocol       Interventions Planned (Treatment may consist of any combination of the following):  Current Treatment Recommendations: Strengthening; ROM; Manual; Modalities    See Assessment Note for interventions    Subjective Comments: Patient reports he's feeling a little sore this morning which is unusual for him. He said he's been moving his arm more than normal. He says he's not taking his oxycodone anymore.     Initial Pain Level:     0/10  Post Session Pain Level:     0/10    Medications Last Reviewed: 2025  Updated Objective Findings:  None Today    Treatment

## 2025-06-10 ENCOUNTER — OFFICE VISIT (OUTPATIENT)
Dept: ORTHOPEDIC SURGERY | Age: 70
End: 2025-06-10

## 2025-06-10 ENCOUNTER — HOSPITAL ENCOUNTER (OUTPATIENT)
Dept: PHYSICAL THERAPY | Age: 70
Setting detail: RECURRING SERIES
Discharge: HOME | End: 2025-06-13
Attending: ORTHOPAEDIC SURGERY
Payer: MEDICARE

## 2025-06-10 DIAGNOSIS — M67.921 TENDINOPATHY OF RIGHT BICEPS TENDON: Primary | ICD-10-CM

## 2025-06-10 DIAGNOSIS — Z09 SURGERY FOLLOW-UP: ICD-10-CM

## 2025-06-10 PROCEDURE — 99024 POSTOP FOLLOW-UP VISIT: CPT | Performed by: ORTHOPAEDIC SURGERY

## 2025-06-10 PROCEDURE — 97110 THERAPEUTIC EXERCISES: CPT

## 2025-06-10 NOTE — PROGRESS NOTES
Fredy Lezama  : 1955  Primary: Medicare Part A And B (Medicare)  Secondary: [employer] MEDIC[employer] SUPP [address]  [place] [address]  Phone: [phone]  Fax: [phone] Plan Frequency: 1-2x/week for 90 days    Plan of Care/Certification Expiration Date: 25        Plan of Care/Certification Expiration Date:  Plan of Care/Certification Expiration Date: 25    Frequency/Duration: Plan Frequency: 1-2x/week for 90 days      Time In/Out:   Time In: 1035  Time Out: 1105      PT Visit Info:    Total # of Visits to Date: 3  Progress Note Counter: 3      Visit Count:  3    OUTPATIENT PHYSICAL THERAPY:   Treatment Note 6/10/2025       Episode  (s/p R shoulder RCR, BT)               Treatment Diagnosis:    Complete tear of right rotator cuff, unspecified whether traumatic  Tendinopathy of right biceps tendon  Acute pain of right shoulder  Stiffness of right shoulder, not elsewhere classified  Medical/Referring Diagnosis:    Tendinopathy of right biceps tendon  Right shoulder pain, unspecified chronicity  Referring Physician:  FÁTIMA Caba MD MD Orders:  PT Eval and Treat per protocol  Return MD Appt:  25   Date of Onset:  Onset Date: 25     Allergies: Patient has no known allergies.  Restrictions/Precautions:   Post Surgical Precautions:   per large rotator cuff repair protocol       Interventions Planned (Treatment may consist of any combination of the following):  Current Treatment Recommendations: Strengthening; ROM; Manual; Modalities    See Assessment Note for interventions    Subjective Comments: Patient reports he saw MD this morning. He can come out of the sling at home.     Initial Pain Level:     0 /10  Post Session Pain Level:   0   /10    Medications Last Reviewed: 6/10/2025  Updated Objective Findings:  Shoulder:  PROM Shoulder (Degrees)  R Shoulder Flex  (0-180): 90  R Shoulder ABduction (0-180): 85  R Shoulder Int Rotation

## 2025-06-12 ENCOUNTER — HOSPITAL ENCOUNTER (OUTPATIENT)
Dept: PHYSICAL THERAPY | Age: 70
Setting detail: RECURRING SERIES
Discharge: HOME | End: 2025-06-15
Attending: ORTHOPAEDIC SURGERY
Payer: MEDICARE

## 2025-06-12 PROCEDURE — 97110 THERAPEUTIC EXERCISES: CPT

## 2025-06-12 NOTE — PROGRESS NOTES
Fredy Lezama  : 1955  Primary: Medicare Part A And B (Medicare)  Secondary: [employer] [place] [place] [place] [address]   [place] [address]  Phone: [phone]  Fax: [phone] Plan Frequency: 1-2x/week for 90 days    Plan of Care/Certification Expiration Date: 25        Plan of Care/Certification Expiration Date:  Plan of Care/Certification Expiration Date: 25    Frequency/Duration: Plan Frequency: 1-2x/week for 90 days      Time In/Out:   Time In: 1300  Time Out: 1335      PT Visit Info:    Total # of Visits to Date: 4  Progress Note Counter: 4      Visit Count:  4    OUTPATIENT PHYSICAL THERAPY:   Treatment Note 2025       Episode  (s/p R shoulder RCR, BT)               Treatment Diagnosis:    Complete tear of right rotator cuff, unspecified whether traumatic  Tendinopathy of right biceps tendon  Acute pain of right shoulder  Stiffness of right shoulder, not elsewhere classified  Medical/Referring Diagnosis:    Tendinopathy of right biceps tendon  Right shoulder pain, unspecified chronicity  Referring Physician:  FÁTIMA Caba MD MD Orders:  PT Eval and Treat per protocol  Return MD Appt:  25   Date of Onset:  Onset Date: 25     Allergies: Patient has no known allergies.  Restrictions/Precautions:   Post Surgical Precautions:   per large rotator cuff repair protocol       Interventions Planned (Treatment may consist of any combination of the following):  Current Treatment Recommendations: Strengthening; ROM; Manual; Modalities    See Assessment Note for interventions    Subjective Comments: Patient reports he has very minimal pain.  He reports a little soreness today, he did not wear the sling at all yesterday, and did some yardwork (L UE).      Initial Pain Level:     0 /10  Post Session Pain Level:   0   /10    Medications Last Reviewed: 2025  Updated Objective Findings:  Shoulder:     None today    Treatment   THERAPEUTIC

## 2025-06-18 ENCOUNTER — HOSPITAL ENCOUNTER (OUTPATIENT)
Dept: PHYSICAL THERAPY | Age: 70
Setting detail: RECURRING SERIES
Discharge: HOME | End: 2025-06-21
Attending: ORTHOPAEDIC SURGERY
Payer: MEDICARE

## 2025-06-18 PROCEDURE — 97110 THERAPEUTIC EXERCISES: CPT

## 2025-06-18 NOTE — PROGRESS NOTES
Fredy Lezama  : 1955  Primary: Medicare Part A And B (Medicare)  Secondary: [employer] MEDIC[employer] SUPP [address]  [place] [address]  Phone: [phone]  Fax: [phone] Plan Frequency: 1-2x/week for 90 days    Plan of Care/Certification Expiration Date: 25        Plan of Care/Certification Expiration Date:  Plan of Care/Certification Expiration Date: 25    Frequency/Duration: Plan Frequency: 1-2x/week for 90 days      Time In/Out:   Time In: 0850  Time Out: 09      PT Visit Info:    Total # of Visits to Date: 5  Progress Note Counter: 5      Visit Count:  5    OUTPATIENT PHYSICAL THERAPY:   Treatment Note 2025       Episode  (s/p R shoulder RCR, BT)               Treatment Diagnosis:    Complete tear of right rotator cuff, unspecified whether traumatic  Tendinopathy of right biceps tendon  Acute pain of right shoulder  Stiffness of right shoulder, not elsewhere classified  Medical/Referring Diagnosis:    Tendinopathy of right biceps tendon  Right shoulder pain, unspecified chronicity  Referring Physician:  FÁTIMA Caba MD MD Orders:  PT Eval and Treat per protocol  Return MD Appt:  25   Date of Onset:  Onset Date: 25     Allergies: Patient has no known allergies.  Restrictions/Precautions:   Post Surgical Precautions:  per large rotator cuff repair protocol       Interventions Planned (Treatment may consist of any combination of the following):  Current Treatment Recommendations: Strengthening; ROM; Manual; Modalities    See Assessment Note for interventions    Subjective Comments: Patient reports his elbow is bothering him a little bit but he has been using the lower part of his arm at home.    Initial Pain Level:     0 /10  Post Session Pain Level:   0   /10    Medications Last Reviewed: 2025  Updated Objective Findings:  Shoulder:  PROM Shoulder (Degrees)  R Shoulder Flex  (0-180): 110  R Shoulder ABduction

## 2025-06-20 ENCOUNTER — HOSPITAL ENCOUNTER (OUTPATIENT)
Dept: PHYSICAL THERAPY | Age: 70
Setting detail: RECURRING SERIES
Discharge: HOME | End: 2025-06-23
Attending: ORTHOPAEDIC SURGERY
Payer: MEDICARE

## 2025-06-20 PROCEDURE — 97110 THERAPEUTIC EXERCISES: CPT

## 2025-06-20 NOTE — PROGRESS NOTES
Fredy Lezama  : 1955  Primary: Medicare Part A And B (Medicare)  [facility] [address]  [place] [address]  Phone: [phone]  Fax: [phone] Plan Frequency: 1-2x/week for 90 days    Plan of Care/Certification Expiration Date: 25        Plan of Care/Certification Expiration Date:  Plan of Care/Certification Expiration Date: 25    Frequency/Duration: Plan Frequency: 1-2x/week for 90 days      Time In/Out:   Time In: 905  Time Out: 940      PT Visit Info:    Total # of Visits to Date: 6  Progress Note Counter: 6      Visit Count:  6    OUTPATIENT PHYSICAL THERAPY:   Treatment Note 2025       Episode  (s/p R shoulder RCR, BT)               Treatment Diagnosis:    Complete tear of right rotator cuff, unspecified whether traumatic  Tendinopathy of right biceps tendon  Acute pain of right shoulder  Stiffness of right shoulder, not elsewhere classified  Medical/Referring Diagnosis:    Tendinopathy of right biceps tendon  Right shoulder pain, unspecified chronicity  Referring Physician:  FÁTIMA Caba MD MD Orders:  PT Eval and Treat per protocol  Return MD Appt:  25   Date of Onset:  Onset Date: 25     Allergies: Patient has no known allergies.  Restrictions/Precautions:   Post Surgical Precautions:  per large rotator cuff repair protocol       Interventions Planned (Treatment may consist of any combination of the following):  Current Treatment Recommendations: Strengthening; ROM; Manual; Modalities    See Assessment Note for interventions    Subjective Comments: Patient reports shoulder is doing good.  He is trying to do a little more at home, but is being very careful with his shoulder.      Initial Pain Level:     0 /10  Post Session Pain Level:   0   /10    Medications Last Reviewed: 2025  Updated Objective Findings:  Shoulder:         Treatment   THERAPEUTIC EXERCISE: (30 minutes):

## 2025-06-24 ENCOUNTER — HOSPITAL ENCOUNTER (OUTPATIENT)
Dept: PHYSICAL THERAPY | Age: 70
Setting detail: RECURRING SERIES
Discharge: HOME | End: 2025-06-27
Attending: ORTHOPAEDIC SURGERY
Payer: MEDICARE

## 2025-06-24 PROCEDURE — 97110 THERAPEUTIC EXERCISES: CPT

## 2025-06-24 NOTE — PROGRESS NOTES
Fredy Lezama  : 1955  Primary: Medicare Part A And B (Medicare)  Secondary: [employer] MEDIC[employer] SUPP [address]  [place] [address]  Phone: [phone]  Fax: [phone] Plan Frequency: 1-2x/week for 90 days    Plan of Care/Certification Expiration Date: 25        Plan of Care/Certification Expiration Date:  Plan of Care/Certification Expiration Date: 25    Frequency/Duration: Plan Frequency: 1-2x/week for 90 days      Time In/Out:   Time In: 45  Time Out: 1015      PT Visit Info:    Total # of Visits to Date: 7  Progress Note Counter: 7      Visit Count:  7    OUTPATIENT PHYSICAL THERAPY:   Treatment Note 2025       Episode  (s/p R shoulder RCR, BT)               Treatment Diagnosis:    Complete tear of right rotator cuff, unspecified whether traumatic  Tendinopathy of right biceps tendon  Acute pain of right shoulder  Stiffness of right shoulder, not elsewhere classified  Medical/Referring Diagnosis:    Tendinopathy of right biceps tendon  Right shoulder pain, unspecified chronicity  Referring Physician:  FÁTIMA Caba MD MD Orders:  PT Eval and Treat per protocol  Return MD Appt:  25   Date of Onset:  Onset Date: 25     Allergies: Patient has no known allergies.  Restrictions/Precautions:   Post Surgical Precautions:  per large rotator cuff repair protocol       Interventions Planned (Treatment may consist of any combination of the following):  Current Treatment Recommendations: Strengthening; ROM; Manual; Modalities    See Assessment Note for interventions    Subjective Comments: Patient reports he had to work on his [employer] system this weekend and tried not to use his right shoulder but his arm was sore last night.    Initial Pain Level:     2-3 /10  Post Session Pain Level:   2-3   /10    Medications Last Reviewed: 2025  Updated Objective Findings:  Shoulder:         Treatment   THERAPEUTIC EXERCISE: (30

## 2025-06-26 ENCOUNTER — APPOINTMENT (OUTPATIENT)
Dept: PHYSICAL THERAPY | Age: 70
End: 2025-06-26
Attending: ORTHOPAEDIC SURGERY
Payer: MEDICARE

## 2025-07-08 ENCOUNTER — HOSPITAL ENCOUNTER (OUTPATIENT)
Dept: PHYSICAL THERAPY | Age: 70
Setting detail: RECURRING SERIES
Discharge: HOME | End: 2025-07-11
Attending: ORTHOPAEDIC SURGERY
Payer: MEDICARE

## 2025-07-08 PROCEDURE — 97110 THERAPEUTIC EXERCISES: CPT

## 2025-07-08 NOTE — PROGRESS NOTES
Fredy Lezama  : 1955  Primary: Medicare Part A And B (Medicare)  Secondary: [employer] MEDIC[employer] SUPP [address]  [place] [address]  Phone: [phone]  Fax: [phone] Plan Frequency: 1-2x/week for 90 days    Plan of Care/Certification Expiration Date: 25        Plan of Care/Certification Expiration Date:  Plan of Care/Certification Expiration Date: 25    Frequency/Duration: Plan Frequency: 1-2x/week for 90 days      Time In/Out:   Time In: 45  Time Out: 1025      PT Visit Info:    Total # of Visits to Date: 8  Progress Note Counter: 8      Visit Count:  8    OUTPATIENT PHYSICAL THERAPY:   Treatment Note 2025       Episode  (s/p R shoulder RCR, BT)               Treatment Diagnosis:    Complete tear of right rotator cuff, unspecified whether traumatic  Tendinopathy of right biceps tendon  Acute pain of right shoulder  Stiffness of right shoulder, not elsewhere classified  Medical/Referring Diagnosis:    Tendinopathy of right biceps tendon  Right shoulder pain, unspecified chronicity  Referring Physician:  FÁTIMA Caba MD MD Orders:  PT Eval and Treat per protocol  Return MD Appt:  25   Date of Onset:  Onset Date: 25     Allergies: Patient has no known allergies.  Restrictions/Precautions:   Post Surgical Precautions:  per large rotator cuff repair protocol       Interventions Planned (Treatment may consist of any combination of the following):  Current Treatment Recommendations: Strengthening; ROM; Manual; Modalities    See Assessment Note for interventions    Subjective Comments: Patient reports he had to work on his [employer] system again and dig in the yard.     Initial Pain Level:     2-3 /10  Post Session Pain Level:   2-3   /10    Medications Last Reviewed: 2025  Updated Objective Findings:  Shoulder:         Treatment   THERAPEUTIC EXERCISE: (40 minutes):    Exercises to improve R shoulder mobility and ROM. Pt

## 2025-07-10 ENCOUNTER — HOSPITAL ENCOUNTER (OUTPATIENT)
Dept: PHYSICAL THERAPY | Age: 70
Setting detail: RECURRING SERIES
Discharge: HOME | End: 2025-07-13
Attending: ORTHOPAEDIC SURGERY
Payer: MEDICARE

## 2025-07-10 PROCEDURE — 97110 THERAPEUTIC EXERCISES: CPT

## 2025-07-10 NOTE — PROGRESS NOTES
Fredy Lezama  : 1955  Primary: Medicare Part A And B (Medicare)  Secondary: [facility] [address]  [place] [address]  Phone: [phone]  Fax: [phone] Plan Frequency: 1-2x/week for 90 days    Plan of Care/Certification Expiration Date: 25        Plan of Care/Certification Expiration Date:  Plan of Care/Certification Expiration Date: 25    Frequency/Duration: Plan Frequency: 1-2x/week for 90 days      Time In/Out:   Time In: 1045  Time Out: 1130      PT Visit Info:    Total # of Visits to Date: 9  Progress Note Counter: 9      Visit Count:  9    OUTPATIENT PHYSICAL THERAPY:   Treatment Note 7/10/2025       Episode  (s/p R shoulder RCR, BT)               Treatment Diagnosis:    Complete tear of right rotator cuff, unspecified whether traumatic  Tendinopathy of right biceps tendon  Acute pain of right shoulder  Stiffness of right shoulder, not elsewhere classified  Medical/Referring Diagnosis:    Tendinopathy of right biceps tendon  Right shoulder pain, unspecified chronicity  Referring Physician:  FÁTIMA Caba MD MD Orders:  PT Eval and Treat per protocol  Return MD Appt:  25   Date of Onset:  Onset Date: 25     Allergies: Patient has no known allergies.  Restrictions/Precautions:   Post Surgical Precautions:  per large rotator cuff repair protocol       Interventions Planned (Treatment may consist of any combination of the following):  Current Treatment Recommendations: Strengthening; ROM; Manual; Modalities    See Assessment Note for interventions    Subjective Comments: Patient reports some soreness today, but not really \"pain\".  He continues to try to to be smart about how he uses his arm, but is trying to do a little more with it.     Initial Pain Level:     2-3 /10  Post Session Pain Level:   2-3   /10    Medications Last Reviewed: 7/10/2025  Updated Objective Findings:  Shoulder:         Treatment

## 2025-07-15 ENCOUNTER — HOSPITAL ENCOUNTER (OUTPATIENT)
Dept: PHYSICAL THERAPY | Age: 70
Setting detail: RECURRING SERIES
Discharge: HOME | End: 2025-07-18
Attending: ORTHOPAEDIC SURGERY
Payer: MEDICARE

## 2025-07-15 PROCEDURE — 97110 THERAPEUTIC EXERCISES: CPT

## 2025-07-15 ASSESSMENT — PAIN SCALES - GENERAL: PAINLEVEL_OUTOF10: 2

## 2025-07-15 ASSESSMENT — PAIN DESCRIPTION - LOCATION: LOCATION: SHOULDER

## 2025-07-15 NOTE — PROGRESS NOTES
Fredy Lezama  : 1955  Primary: Medicare Part A And B (Medicare)  Secondary: [employer] MEDICARE SUPP [facility] [address]  [place] [address]  Phone: [phone]  Fax: [phone] Plan Frequency: 1-2x/week for 90 days    Plan of Care/Certification Expiration Date: 25        Plan of Care/Certification Expiration Date:  Plan of Care/Certification Expiration Date: 25    Frequency/Duration: Plan Frequency: 1-2x/week for 90 days      Time In/Out:   Time In: 1432  Time Out: 1510      PT Visit Info:    Total # of Visits to Date: 10  Progress Note Counter: 10      Visit Count:  10    OUTPATIENT PHYSICAL THERAPY:   Treatment Note 7/15/2025       Episode  (s/p R shoulder RCR, BT)               Treatment Diagnosis:    Complete tear of right rotator cuff, unspecified whether traumatic  Tendinopathy of right biceps tendon  Acute pain of right shoulder  Stiffness of right shoulder, not elsewhere classified  Medical/Referring Diagnosis:    Tendinopathy of right biceps tendon  Right shoulder pain, unspecified chronicity  Referring Physician:  FÁTIMA Caba MD MD Orders:  PT Eval and Treat per protocol  Return MD Appt:  25   Date of Onset:  Onset Date: 25     Allergies: Patient has no known allergies.  Restrictions/Precautions:   Post Surgical Precautions:  per large rotator cuff repair protocol       Interventions Planned (Treatment may consist of any combination of the following):  Current Treatment Recommendations: Strengthening; ROM; Manual; Modalities    See Assessment Note for interventions    Subjective Comments: Patient reports he just got back from [place] about 20 minutes ago. He says he's been moving his shoulder around a lot. He says he took some Aleve before his appointment today.    Initial Pain Level:   Shoulder 2/10  Post Session Pain Level:   Shoulder 2/10     Medications Last Reviewed: 7/15/2025  Updated Objective Findings:  Shoulder:

## 2025-07-17 ENCOUNTER — HOSPITAL ENCOUNTER (OUTPATIENT)
Dept: PHYSICAL THERAPY | Age: 70
Setting detail: RECURRING SERIES
Discharge: HOME | End: 2025-07-20
Attending: ORTHOPAEDIC SURGERY
Payer: MEDICARE

## 2025-07-17 PROCEDURE — 97110 THERAPEUTIC EXERCISES: CPT

## 2025-07-17 NOTE — PROGRESS NOTES
Fredy Lezama  : 1955  Primary: Medicare Part A And B (Medicare)  Secondary: [employer] [place] MEDIC[employer] SUPP [facility] [address]  [place] [address]  Phone: [phone]  Fax: [phone] Plan Frequency: 1-2x/week for 90 days    Plan of Care/Certification Expiration Date: 25        Plan of Care/Certification Expiration Date:  Plan of Care/Certification Expiration Date: 25    Frequency/Duration: Plan Frequency: 1-2x/week for 90 days      Time In/Out:   Time In: 910  Time Out: 948      PT Visit Info:        Visit Count:  11                OUTPATIENT PHYSICAL THERAPY:             Progress Report 2025               Episode (s/p R shoulder RCR, BT)         Treatment Diagnosis:    Complete tear of right rotator cuff, unspecified whether traumatic  Tendinopathy of right biceps tendon  Acute pain of right shoulder  Stiffness of right shoulder, not elsewhere classified  Medical/Referring Diagnosis:    Tendinopathy of right biceps tendon  Right shoulder pain, unspecified chronicity      Referring Physician:  FÁTIMA Caba MD MD Orders:  PT Eval and Treat per protocol   Return MD Appt:  6/10/25  Date of Onset:  Onset Date: 25     Allergies:  Patient has no known allergies.  Restrictions/Precautions:    Post Surgical Precautions: per large rotator cuff repair protocol       Medications Last Reviewed: 2025     SUBJECTIVE   History of Injury/Illness (Reason for Referral):  He had bunion surgery in August. November he slipped down the stairs and hit his R elbow. He did have a bicep tear prior to fall and was getting injections for that. Had an MRI and then surgery. Dr. Henderson he had a previous R shoulder scope. Surgery was May 12th. No pain/minimal pain in the shoulder. He was taking his pain medication and icing the shoulder. He is no longer taking the pain medication. Occasionally throbbing pain in the shoulder.   Patient Stated Goal(s):  \"to get

## 2025-07-17 NOTE — PROGRESS NOTES
Fredy Lezama  : 1955  Primary: Medicare Part A And B (Medicare)  Secondary: [employer] [place] [employer] SUPP [address]  [place] [address]  Phone: [phone]  Fax: [phone] Plan Frequency: 1-2x/week for 90 days    Plan of Care/Certification Expiration Date: 25        Plan of Care/Certification Expiration Date:  Plan of Care/Certification Expiration Date: 25    Frequency/Duration: Plan Frequency: 1-2x/week for 90 days      Time In/Out:   Time In: 910  Time Out: 948      PT Visit Info:          Visit Count:  11    OUTPATIENT PHYSICAL THERAPY:   Treatment Note 2025       Episode  (s/p R shoulder RCR, BT)               Treatment Diagnosis:    Complete tear of right rotator cuff, unspecified whether traumatic  Tendinopathy of right biceps tendon  Acute pain of right shoulder  Stiffness of right shoulder, not elsewhere classified  Medical/Referring Diagnosis:    Tendinopathy of right biceps tendon  Right shoulder pain, unspecified chronicity  Referring Physician:  FÁTIMA Caba MD MD Orders:  PT Eval and Treat per protocol  Return MD Appt:  25   Date of Onset:  Onset Date: 25     Allergies: Patient has no known allergies.  Restrictions/Precautions:   Post Surgical Precautions:  per large rotator cuff repair protocol       Interventions Planned (Treatment may consist of any combination of the following):  Current Treatment Recommendations: Strengthening; ROM; Manual; Modalities    See Assessment Note for interventions    Subjective Comments: Patient reports his shoulder has been feeling pretty good. He cannot lift a gallon of milk out of the fridge with his right arm.    Initial Pain Level:     2 /10  Post Session Pain Level:   2   /10     Medications Last Reviewed: 2025  Updated Objective Findings:  Shoulder:  AROM Shoulder (Degrees)  R Shoulder Flexion (0-180): 130  R Shoulder Int Rotation  (0-70): reaches to L1 behind back  R

## 2025-07-22 ENCOUNTER — HOSPITAL ENCOUNTER (OUTPATIENT)
Dept: PHYSICAL THERAPY | Age: 70
Setting detail: RECURRING SERIES
Discharge: HOME | End: 2025-07-25
Attending: ORTHOPAEDIC SURGERY
Payer: MEDICARE

## 2025-07-22 PROCEDURE — 97110 THERAPEUTIC EXERCISES: CPT

## 2025-07-22 NOTE — PROGRESS NOTES
Fredy Lezama  : 1955  Primary: Medicare Part A [employer] B (Medicare)  Secondary: [employer] [place] MEDIC[employer] SUPP [facility] [address]  [place] [address]  Phone: [phone]  Fax: [phone] Plan Frequency: 1-2x/week for 90 days    Plan of Care/Certification Expiration Date: 25        Plan of Care/Certification Expiration Date:  Plan of Care/Certification Expiration Date: 25    Frequency/Duration: Plan Frequency: 1-2x/week for 90 days      Time In/Out:   Time In: 0815  Time Out: 0900      PT Visit Info:          Visit Count:  12    OUTPATIENT PHYSICAL THERAPY:   Treatment Note 2025       Episode  (s/p R shoulder RCR, BT)               Treatment Diagnosis:    Complete tear of right rotator cuff, unspecified whether traumatic  Tendinopathy of right biceps tendon  Acute pain of right shoulder  Stiffness of right shoulder, not elsewhere classified  Medical/Referring Diagnosis:    Tendinopathy of right biceps tendon  Right shoulder pain, unspecified chronicity  Referring Physician:  FÁTIMA Caba MD MD Orders:  PT Eval and Treat per protocol  Return MD Appt:  25   Date of Onset:  Onset Date: 25     Allergies: Patient has no known allergies.  Restrictions/Precautions:   Post Surgical Precautions:  per large rotator cuff repair protocol       Interventions Planned (Treatment may consist of any combination of the following):  Current Treatment Recommendations: Strengthening; ROM; Manual; Modalities    See Assessment Note for interventions    Subjective Comments: Patient reports he still can't  a gallon of milk.     Initial Pain Level:     2 /10  Post Session Pain Level:   2   /10     Medications Last Reviewed: 2025  Updated Objective Findings:  Shoulder:         Treatment   THERAPEUTIC EXERCISE: (40 minutes):    Exercises to improve R shoulder mobility and ROM. Pt supine and PROM to the R shoulder for ER at neutral, ER at 45-60 deg abd,

## 2025-07-23 ENCOUNTER — OFFICE VISIT (OUTPATIENT)
Dept: ORTHOPEDIC SURGERY | Age: 70
End: 2025-07-23

## 2025-07-23 DIAGNOSIS — Z09 SURGERY FOLLOW-UP: Primary | ICD-10-CM

## 2025-07-23 DIAGNOSIS — M67.921 TENDINOPATHY OF RIGHT BICEPS TENDON: ICD-10-CM

## 2025-07-23 PROCEDURE — 99024 POSTOP FOLLOW-UP VISIT: CPT | Performed by: PHYSICIAN ASSISTANT

## 2025-07-23 NOTE — PROGRESS NOTES
Name: Fredy Lezama  YOB: 1955  Gender: male  MRN: [id number]    CC:   Chief Complaint   Patient presents with    Follow-up     S/p right shoulder scope RCR(large) open biceps tenodesis DOS 5/12/25   about 2-3 month out from  Right Shoulder Arthroscopy Rotator Cuff Repair - Right and Right Shoulder Open Biceps Tenodesis - Right  5/12/2025    HPI: Patient is status post Right Shoulder Arthroscopy Rotator Cuff Repair - Right and Right Shoulder Open Biceps Tenodesis - Right.   Patient feels as if they are doing well.   They have minimal pain and no complaints.   The patient feels as if they are progressing as expected.    No Known Allergies  Past Medical History:   Diagnosis Date    Hyperlipidemia      Past Surgical History:   Procedure Laterality Date    BICEPS TENDON REPAIR Right 5/12/2025    RIGHT SHOULDER OPEN BICEPS TENODESIS performed by FÁTIMA Caba MD at [facility]    FOOT SURGERY Bilateral 8/20/2024    Bilateral first metatarsophangeal cheilectomy performed by William Patel III, MD at [facility]    ROTATOR CUFF REPAIR Bilateral     SHOULDER ARTHROSCOPY Right 5/12/2025    RIGHT SHOULDER ARTHROSCOPY ROTATOR CUFF REPAIR performed by FÁTIMA Caba MD at [facility]     No family history on file.  Social History     Socioeconomic History    Marital status:      Spouse name: Not on file    Number of children: Not on file    Years of education: Not on file    Highest education level: Not on file   Occupational History    Not on file   Tobacco Use    Smoking status: Never    Smokeless tobacco: Never   Vaping Use    Vaping status: Never Used   Substance and Sexual Activity    Alcohol use: Yes     Alcohol/week: 10.0 standard drinks of alcohol     Types: 10 Drinks containing 0.5 oz of alcohol per week    Drug use: Never    Sexual activity: Not on file   Other Topics Concern    Not on file   Social History Narrative    Not on file     Social Drivers of Health     Financial Resource Strain: Low Risk

## 2025-07-24 ENCOUNTER — HOSPITAL ENCOUNTER (OUTPATIENT)
Dept: PHYSICAL THERAPY | Age: 70
Setting detail: RECURRING SERIES
Discharge: HOME | End: 2025-07-27
Attending: ORTHOPAEDIC SURGERY
Payer: MEDICARE

## 2025-07-24 PROCEDURE — 97110 THERAPEUTIC EXERCISES: CPT

## 2025-07-24 NOTE — PROGRESS NOTES
Fredy Lezama  : 1955  Primary: Medicare Part [employer] B (Medicare)  Secondary: [employer] [place]IC[employer] SUPP [facility] [address]  [place] [address]  Phone: [phone]  Fax: [phone] Plan Frequency: 1-2x/week for 90 days    Plan of Care/Certification Expiration Date: 25        Plan of Care/Certification Expiration Date:  Plan of Care/Certification Expiration Date: 25    Frequency/Duration: Plan Frequency: 1-2x/week for 90 days      Time In/Out:   Time In: 820  Time Out: 910      PT Visit Info:          Visit Count:  13    OUTPATIENT PHYSICAL THERAPY:   Treatment Note 2025       Episode  (s/p R shoulder RCR, BT)               Treatment Diagnosis:    Complete tear of right rotator cuff, unspecified whether traumatic  Tendinopathy of right biceps tendon  Acute pain of right shoulder  Stiffness of right shoulder, not elsewhere classified  Medical/Referring Diagnosis:    Tendinopathy of right biceps tendon  Right shoulder pain, unspecified chronicity  Referring Physician:  FÁTIMA Caba MD MD Orders:  PT Eval and Treat per protocol  Return MD Appt:  25   Date of Onset:  Onset Date: 25     Allergies: Patient has no known allergies.  Restrictions/Precautions:   Post Surgical Precautions:  per large rotator cuff repair protocol       Interventions Planned (Treatment may consist of any combination of the following):  Current Treatment Recommendations: Strengthening; ROM; Manual; Modalities    See Assessment Note for interventions    Subjective Comments: Patient reports feeling sore today due to work activities but no reports of excessive pain    Initial Pain Level:     2 /10  Post Session Pain Level:   2   /10     Medications Last Reviewed: 2025  Updated Objective Findings:  Shoulder:         Treatment   THERAPEUTIC EXERCISE: (40 minutes):    Exercises to improve R shoulder mobility and ROM. Pt supine and PROM to the R shoulder for ER at

## 2025-07-29 ENCOUNTER — HOSPITAL ENCOUNTER (OUTPATIENT)
Dept: PHYSICAL THERAPY | Age: 70
Setting detail: RECURRING SERIES
Discharge: HOME | End: 2025-08-01
Attending: ORTHOPAEDIC SURGERY
Payer: MEDICARE

## 2025-07-29 PROCEDURE — 97110 THERAPEUTIC EXERCISES: CPT

## 2025-07-29 NOTE — PROGRESS NOTES
elevation.  Exercises per grid below.     Date:  7-8-25 Date:  7/10/25 Date:  7/15/25 Date  7-17-25 Date  7/22/25 Date  7-24-25 Date  7-29-25   Activity/Exercise Parameters Parameters Parameters  parameters     Bench press 0# 2x10 Wand 2x10  1# 10  2# 10  3# 10  5# 10 3# 15  5# 15  7# 15x 5#10  8# 2x10 5# 15x  8# 15x   Side lying ER 0# 2x10 ER  0# 2x10 slow eccentric 1# 2x10 1# 2x15 1# 2x15 1# 2x15 2# 2x10   Side lying shoulder abduction 0# 2x10 0# 2x10 1# 2x10 -   2# 2x10   Prone shoulder extension 0# 2x10 0# 2x10 hold at top, slow eccentric 2x10 green   Standing 1# 2x10 1# 2x15 1# 2x10 2# 2x10   Prone middle trap 0# 2x10 0# 2x10  1# 2x10 1# 2x10 1# 2x10 2# 2x10   Prone lower trap - - - 1# 2x10 1# 2x10 1# 2x10 2# 2x10   T-band IR and ER Yellow 2x10 ea way reviewed Red 2x10 ea way  Red 15 ea [address] Red 20 e[address]  Red [address]    Bent over row     5# 2x15  5# 2x15     Treatment/Session Summary:    Treatment Assessment: Patient reported feeling general soreness following session but no pain.   Communication/Consultation: None today  Equipment provided today: HEP  Recommendations/Intent for next treatment session: Next visit will focus on R shoulder ROM per protocol.    >Total Treatment Billable Duration: 40 minutes   Time In: 0817  Time Out: 0903     Allison Tomas PT       Charge Capture  Events  [employer] Portal  Appt Desk  Attendance Report     Future Appointments   Date Time Provider Department Center   7/31/2025  8:15 AM Allison Tomas, [facility]

## 2025-07-31 ENCOUNTER — HOSPITAL ENCOUNTER (OUTPATIENT)
Dept: PHYSICAL THERAPY | Age: 70
Setting detail: RECURRING SERIES
End: 2025-07-31
Attending: ORTHOPAEDIC SURGERY
Payer: MEDICARE

## 2025-07-31 PROCEDURE — 97110 THERAPEUTIC EXERCISES: CPT

## 2025-07-31 NOTE — PROGRESS NOTES
Fredy Lezama  : 1955  Primary: Medicare Part [employer] B (Medicare)  Secondary: [employer] [address]  [place] [address]  Phone: [phone]  Fax: [phone] Plan Frequency: 1-2x/week for 90 days    Plan of Care/Certification Expiration Date: 25        Plan of Care/Certification Expiration Date:  Plan of Care/Certification Expiration Date: 25    Frequency/Duration: Plan Frequency: 1-2x/week for 90 days      Time In/Out:   Time In: 0815  Time Out: 0900      PT Visit Info:          Visit Count:  15    OUTPATIENT PHYSICAL THERAPY:   Treatment Note 2025       Episode  (s/p R shoulder RCR, BT)               Treatment Diagnosis:    Complete tear of right rotator cuff, unspecified whether traumatic  Tendinopathy of right biceps tendon  Acute pain of right shoulder  Stiffness of right shoulder, not elsewhere classified  Medical/Referring Diagnosis:    Tendinopathy of right biceps tendon  Right shoulder pain, unspecified chronicity  Referring Physician:  FÁTIMA Caba MD MD Orders:  PT Eval and Treat per protocol  Return MD Appt:  25   Date of Onset:  Onset Date: 25     Allergies: Patient has no known allergies.  Restrictions/Precautions:   Post Surgical Precautions:  per large rotator cuff repair protocol       Interventions Planned (Treatment may consist of any combination of the following):  Current Treatment Recommendations: Strengthening; ROM; Manual; Modalities    See Assessment Note for interventions    Subjective Comments: Patient reports feeling sore following yard work yesterday.    Initial Pain Level:     2 /10  Post Session Pain Level:   2   /10     Medications Last Reviewed: 2025  Updated Objective Findings:  Shoulder:  AROM Shoulder (Degrees)  R Shoulder Flexion (0-180): 135  R Shoulder Ext Rotation (0-90): 55 at neutral  Shoulder Elbow Strength Testing (MMT)  R Shoulder Flexion: 4/5  R Shoulder Internal

## (undated) DEVICE — PAD FLR CLN ABSORBENT 46X40 IN IMPERV BLU QUIK SUITE LTX

## (undated) DEVICE — 3M™ STERI-DRAPE™ U-DRAPE 1015: Brand: STERI-DRAPE™

## (undated) DEVICE — GLOVE SURG SZ 7 L12IN FNGR THK79MIL GRN LTX FREE

## (undated) DEVICE — GLOVE SURG SZ 65 CRM LTX FREE POLYISOPRENE POLYMER BEAD ANTI

## (undated) DEVICE — SHOULDER STABILIZATION KIT,                                    DISPOSABLE 12 PER BOX

## (undated) DEVICE — SUTURE PDS II SZ 0 L27IN ABSRB VLT L36MM CT-1 1/2 CIR Z340H

## (undated) DEVICE — INTENDED FOR TISSUE SEPARATION, AND OTHER PROCEDURES THAT REQUIRE A SHARP SURGICAL BLADE TO PUNCTURE OR CUT.: Brand: BARD-PARKER ® STAINLESS STEEL BLADES

## (undated) DEVICE — ELECTRODE NDL 2.8IN COAT VALLEYLAB

## (undated) DEVICE — SUTURE FIBERLOOP SZ 2-0 L20IN NONABSORBABLE BLU BRAID AR7234C

## (undated) DEVICE — SOLUTION IRRIG 1000ML H2O PIC PLAS SHATTERPROOF CONTAINER

## (undated) DEVICE — [THREADED CANNULA.  DO NOT RESTERILIZE,  DO NOT USE IF PACKAGE IS DAMAGED]: Brand: DRI-LOK

## (undated) DEVICE — PRECISION THIN, OFFSET (5.5 X 0.38 X 25.0MM)

## (undated) DEVICE — Device

## (undated) DEVICE — ADHESIVE SKIN CLOSURE WND 8.661X1.5 IN 22 CM LIQUIBAND SECUR

## (undated) DEVICE — GLOVE SURG SZ 8 L12IN FNGR THK79MIL GRN LTX FREE

## (undated) DEVICE — ELECTRODE PT RET AD L9FT HI MOIST COND ADH HYDRGEL CORDED

## (undated) DEVICE — SUTURE NONABSORBABLE MONOFILAMENT 3-0 PS-1 18 IN BLK ETHILON 1663H

## (undated) DEVICE — SUTURE PDS II SZ 0 L27IN ABSRB VLT L26MM CT-2 1/2 CIR Z334H

## (undated) DEVICE — SOL IRR SOD CHL 0.9% TITAN XL CNTNR 3000ML

## (undated) DEVICE — TUBING PMP L16FT MAIN DISP FOR AR-6400 AR-6475 Â€“ ORDER MULTIPLES OF 10 EACH

## (undated) DEVICE — CANNULA ARTHSCP L7CM DIA6MM CONIC TIP THRD NONSHIELDED DISP

## (undated) DEVICE — SOLUTION IRRIG 1000ML 0.9% SOD CHL USP POUR PLAS BTL

## (undated) DEVICE — BUR SHAVER 5 MMX13 CM 8 FLUT OVL FOR AGGRESSIVE BNE COOLCUT

## (undated) DEVICE — GOWN,SIRUS,NONRNF,SETINSLV,XL,20/CS: Brand: MEDLINE

## (undated) DEVICE — APPLICATOR MEDICATED 26 CC SOLUTION HI LT ORNG CHLORAPREP

## (undated) DEVICE — WATERPROOF, BACTERIA PROOF DRESSING WITH ABSORBENT SEE THROUGH PAD: Brand: OPSITE POST-OP VISIBLE 15X10CM CTN 20

## (undated) DEVICE — SUTURE VICRYL SZ 2-0 L27IN ABSRB UD L26MM CT-2 1/2 CIR J269H

## (undated) DEVICE — GLOVE SURG SZ 65 L12IN FNGR THK79MIL GRN LTX FREE

## (undated) DEVICE — ABLATOR ENDOSCOPIC ELECTROCAUTERY 90 DEG RF ASPIRATING STERILE DISPOSABLE APOLLORF I90

## (undated) DEVICE — BLADE SHV L13CM DIA4MM BNE CUT AGG DEB COOLCUT

## (undated) DEVICE — NEPTUNE E-SEP SMOKE EVACUATION PENCIL, COATED, 70MM BLADE, PUSH BUTTON SWITCH: Brand: NEPTUNE E-SEP

## (undated) DEVICE — FIRSTPASS ST SUTURE PASSER, STANDARD: Brand: FIRSTPASS

## (undated) DEVICE — SOFT SILICONE HYDROCELLULAR FOAM DRESSING WITH LOCK AWAY LAYER: Brand: ALLEVYN LIFE XL 21X21 CTN10

## (undated) DEVICE — SWITCH DRAPE TENET 7633

## (undated) DEVICE — FOOT & ANKLE SOFT DR WOMACK: Brand: MEDLINE INDUSTRIES, INC.

## (undated) DEVICE — 4-PORT MANIFOLD: Brand: NEPTUNE 2

## (undated) DEVICE — HEALICOIL REGENESORB 4.75 MM                                    THREADED DILATOR, DISPOSABLE: Brand: HEALICOIL

## (undated) DEVICE — GLOVE ORANGE PI 7 1/2   MSG9075

## (undated) DEVICE — STRIP,CLOSURE,WOUND,MEDI-STRIP,1/2X4: Brand: MEDLINE

## (undated) DEVICE — SHOULDER ARTHROSCOPY PACK: Brand: MEDLINE INDUSTRIES, INC.

## (undated) DEVICE — GLOVE ORANGE PI 7   MSG9070

## (undated) DEVICE — SOLUTION IRRIG 1000ML 09% SOD CHL USP PIC PLAS CONTAINER

## (undated) DEVICE — GOWN,ECLIPSE,NONRNF,XL,ST,30/CS: Brand: MEDLINE

## (undated) DEVICE — SUTURE MONOCRYL SZ 2-0 L27IN ABSRB UD CP-1 1 L36MM 1/2 CIR REV Y266H